# Patient Record
Sex: MALE | Race: WHITE
[De-identification: names, ages, dates, MRNs, and addresses within clinical notes are randomized per-mention and may not be internally consistent; named-entity substitution may affect disease eponyms.]

---

## 2019-02-25 ENCOUNTER — HOSPITAL ENCOUNTER (OUTPATIENT)
Age: 69
End: 2019-02-25
Payer: MEDICARE

## 2019-02-25 DIAGNOSIS — R39.12: Primary | ICD-10-CM

## 2019-02-25 PROCEDURE — 76770 US EXAM ABDO BACK WALL COMP: CPT

## 2019-12-31 ENCOUNTER — HOSPITAL ENCOUNTER (OUTPATIENT)
Dept: HOSPITAL 100 - PT | Age: 69
Discharge: HOME | End: 2019-12-31
Payer: MEDICARE

## 2019-12-31 DIAGNOSIS — M17.12: Primary | ICD-10-CM

## 2019-12-31 PROCEDURE — 97110 THERAPEUTIC EXERCISES: CPT

## 2019-12-31 PROCEDURE — 97161 PT EVAL LOW COMPLEX 20 MIN: CPT

## 2020-04-23 ENCOUNTER — HOSPITAL ENCOUNTER (OUTPATIENT)
Age: 70
End: 2020-04-23
Payer: MEDICARE

## 2020-04-23 ENCOUNTER — HOSPITAL ENCOUNTER (OUTPATIENT)
Dept: HOSPITAL 100 - MTDU | Age: 70
Discharge: HOME | End: 2020-04-23
Payer: MEDICARE

## 2020-04-23 DIAGNOSIS — Z03.818: Primary | ICD-10-CM

## 2020-04-23 DIAGNOSIS — R05: ICD-10-CM

## 2020-04-23 PROCEDURE — G2023 SPECIMEN COLLECT COVID-19: HCPCS

## 2020-04-23 PROCEDURE — 87635 SARS-COV-2 COVID-19 AMP PRB: CPT

## 2020-04-23 PROCEDURE — U0004 COV-19 TEST NON-CDC HGH THRU: HCPCS

## 2020-04-23 PROCEDURE — 71046 X-RAY EXAM CHEST 2 VIEWS: CPT

## 2020-05-02 ENCOUNTER — HOSPITAL ENCOUNTER (EMERGENCY)
Age: 70
Discharge: HOME | End: 2020-05-02
Payer: MEDICARE

## 2020-05-02 VITALS
RESPIRATION RATE: 16 BRPM | OXYGEN SATURATION: 98 % | HEART RATE: 96 BPM | SYSTOLIC BLOOD PRESSURE: 174 MMHG | TEMPERATURE: 97.88 F | DIASTOLIC BLOOD PRESSURE: 69 MMHG

## 2020-05-02 VITALS — BODY MASS INDEX: 29.5 KG/M2

## 2020-05-02 DIAGNOSIS — J45.909: ICD-10-CM

## 2020-05-02 DIAGNOSIS — M79.18: Primary | ICD-10-CM

## 2020-05-02 DIAGNOSIS — G56.03: ICD-10-CM

## 2020-05-02 LAB
ANION GAP: 8 (ref 5–15)
BUN SERPL-MCNC: 20 MG/DL (ref 7–18)
BUN/CREAT RATIO: 19.6 RATIO (ref 10–20)
CALCIUM SERPL-MCNC: 9.1 MG/DL (ref 8.5–10.1)
CARBON DIOXIDE: 23 MMOL/L (ref 21–32)
CHLORIDE: 104 MMOL/L (ref 98–107)
CPK TOTAL, CREATINE KINASE: 48 U/L (ref 39–308)
CRP SERPL-MCNC: 45 MG/L (ref 0–3)
DEPRECATED RDW RBC: 43.5 FL (ref 35.1–43.9)
ERYTHROCYTE [DISTWIDTH] IN BLOOD: 14.5 % (ref 11.6–14.6)
EST GLOM FILT RATE - AFR AMER: 93 ML/MIN (ref 60–?)
ESTIMATED CREATININE CLEARANCE: 70.28 ML/MIN
GLUCOSE: 127 MG/DL (ref 74–106)
HCT VFR BLD AUTO: 41.2 % (ref 40–54)
HEMOGLOBIN: 12.8 G/DL (ref 13–16.5)
HGB BLD-MCNC: 12.8 G/DL (ref 13–16.5)
IMMATURE GRANULOCYTES COUNT: 0.07 X10^3/UL (ref 0–0)
MCV RBC: 82.9 FL (ref 80–94)
MEAN CORP HGB CONC: 31.1 G/DL (ref 32–36)
MEAN PLATELET VOL.: 8.5 FL (ref 6.2–12)
NRBC FLAGGED BY ANALYZER: 0 % (ref 0–5)
PLATELET # BLD: 336 K/MM3 (ref 150–450)
PLATELET COUNT: 336 K/MM3 (ref 150–450)
POTASSIUM: 4.3 MMOL/L (ref 3.5–5.1)
RBC # BLD AUTO: 4.97 M/MM3 (ref 4.6–6.2)
RBC DISTRIBUTION WIDTH CV: 14.5 % (ref 11.6–14.6)
RBC DISTRIBUTION WIDTH SD: 43.5 FL (ref 35.1–43.9)
WBC # BLD AUTO: 13.7 K/MM3 (ref 4.4–11)
WHITE BLOOD COUNT: 13.7 K/MM3 (ref 4.4–11)

## 2020-05-02 PROCEDURE — 86140 C-REACTIVE PROTEIN: CPT

## 2020-05-02 PROCEDURE — A4216 STERILE WATER/SALINE, 10 ML: HCPCS

## 2020-05-02 PROCEDURE — 85652 RBC SED RATE AUTOMATED: CPT

## 2020-05-02 PROCEDURE — 85025 COMPLETE CBC W/AUTO DIFF WBC: CPT

## 2020-05-02 PROCEDURE — 99283 EMERGENCY DEPT VISIT LOW MDM: CPT

## 2020-05-02 PROCEDURE — 82550 ASSAY OF CK (CPK): CPT

## 2020-05-02 PROCEDURE — 80048 BASIC METABOLIC PNL TOTAL CA: CPT

## 2021-08-08 ENCOUNTER — HOSPITAL ENCOUNTER (EMERGENCY)
Age: 71
LOS: 1 days | Discharge: HOME | End: 2021-08-09
Payer: MEDICARE

## 2021-08-08 VITALS
TEMPERATURE: 98.6 F | OXYGEN SATURATION: 95 % | HEART RATE: 101 BPM | SYSTOLIC BLOOD PRESSURE: 149 MMHG | DIASTOLIC BLOOD PRESSURE: 79 MMHG | RESPIRATION RATE: 22 BRPM

## 2021-08-08 VITALS — BODY MASS INDEX: 28.8 KG/M2 | BODY MASS INDEX: 29.5 KG/M2

## 2021-08-08 VITALS — HEART RATE: 112 BPM | RESPIRATION RATE: 16 BRPM

## 2021-08-08 DIAGNOSIS — J98.01: Primary | ICD-10-CM

## 2021-08-08 DIAGNOSIS — Z79.52: ICD-10-CM

## 2021-08-08 DIAGNOSIS — M35.3: ICD-10-CM

## 2021-08-08 PROCEDURE — 71046 X-RAY EXAM CHEST 2 VIEWS: CPT

## 2021-08-08 PROCEDURE — 87426 SARSCOV CORONAVIRUS AG IA: CPT

## 2021-08-08 PROCEDURE — 94640 AIRWAY INHALATION TREATMENT: CPT

## 2021-08-08 PROCEDURE — 99283 EMERGENCY DEPT VISIT LOW MDM: CPT

## 2021-08-09 VITALS
SYSTOLIC BLOOD PRESSURE: 126 MMHG | RESPIRATION RATE: 18 BRPM | DIASTOLIC BLOOD PRESSURE: 52 MMHG | HEART RATE: 67 BPM | OXYGEN SATURATION: 94 %

## 2021-08-09 VITALS — RESPIRATION RATE: 18 BRPM | HEART RATE: 115 BPM | OXYGEN SATURATION: 96 %

## 2021-10-12 ENCOUNTER — HOSPITAL ENCOUNTER (OUTPATIENT)
Age: 71
End: 2021-10-12
Payer: MEDICARE

## 2021-10-12 ENCOUNTER — HOSPITAL ENCOUNTER (EMERGENCY)
Age: 71
LOS: 1 days | Discharge: HOME | End: 2021-10-13
Payer: MEDICARE

## 2021-10-12 VITALS
OXYGEN SATURATION: 96 % | DIASTOLIC BLOOD PRESSURE: 73 MMHG | RESPIRATION RATE: 16 BRPM | HEART RATE: 91 BPM | SYSTOLIC BLOOD PRESSURE: 116 MMHG

## 2021-10-12 VITALS — OXYGEN SATURATION: 94 % | RESPIRATION RATE: 22 BRPM | HEART RATE: 89 BPM

## 2021-10-12 VITALS
SYSTOLIC BLOOD PRESSURE: 119 MMHG | DIASTOLIC BLOOD PRESSURE: 78 MMHG | OXYGEN SATURATION: 97 % | RESPIRATION RATE: 16 BRPM

## 2021-10-12 VITALS
SYSTOLIC BLOOD PRESSURE: 131 MMHG | OXYGEN SATURATION: 96 % | HEART RATE: 89 BPM | DIASTOLIC BLOOD PRESSURE: 80 MMHG | TEMPERATURE: 97.8 F | RESPIRATION RATE: 22 BRPM

## 2021-10-12 VITALS — BODY MASS INDEX: 27.3 KG/M2

## 2021-10-12 DIAGNOSIS — Z79.51: ICD-10-CM

## 2021-10-12 DIAGNOSIS — J45.901: Primary | ICD-10-CM

## 2021-10-12 DIAGNOSIS — J32.0: Primary | ICD-10-CM

## 2021-10-12 DIAGNOSIS — J32.0: ICD-10-CM

## 2021-10-12 PROCEDURE — 70486 CT MAXILLOFACIAL W/O DYE: CPT

## 2021-10-12 PROCEDURE — 99283 EMERGENCY DEPT VISIT LOW MDM: CPT

## 2021-10-12 PROCEDURE — 93005 ELECTROCARDIOGRAM TRACING: CPT

## 2021-10-12 PROCEDURE — 99251: CPT

## 2021-10-12 PROCEDURE — 94640 AIRWAY INHALATION TREATMENT: CPT

## 2021-10-12 PROCEDURE — G0463 HOSPITAL OUTPT CLINIC VISIT: HCPCS

## 2021-10-13 VITALS
SYSTOLIC BLOOD PRESSURE: 115 MMHG | DIASTOLIC BLOOD PRESSURE: 76 MMHG | OXYGEN SATURATION: 95 % | RESPIRATION RATE: 18 BRPM | HEART RATE: 77 BPM

## 2021-10-13 VITALS
RESPIRATION RATE: 10 BRPM | DIASTOLIC BLOOD PRESSURE: 79 MMHG | OXYGEN SATURATION: 97 % | SYSTOLIC BLOOD PRESSURE: 115 MMHG

## 2022-01-03 ENCOUNTER — HOSPITAL ENCOUNTER (OUTPATIENT)
Dept: HOSPITAL 100 - SDC | Age: 72
Discharge: HOME | End: 2022-01-03
Payer: MEDICARE

## 2022-01-03 VITALS
SYSTOLIC BLOOD PRESSURE: 157 MMHG | TEMPERATURE: 97.1 F | DIASTOLIC BLOOD PRESSURE: 76 MMHG | OXYGEN SATURATION: 97 % | RESPIRATION RATE: 16 BRPM | HEART RATE: 56 BPM

## 2022-01-03 VITALS
HEART RATE: 62 BPM | RESPIRATION RATE: 16 BRPM | SYSTOLIC BLOOD PRESSURE: 122 MMHG | OXYGEN SATURATION: 98 % | DIASTOLIC BLOOD PRESSURE: 91 MMHG

## 2022-01-03 VITALS
TEMPERATURE: 97.16 F | DIASTOLIC BLOOD PRESSURE: 76 MMHG | OXYGEN SATURATION: 99 % | HEART RATE: 66 BPM | SYSTOLIC BLOOD PRESSURE: 122 MMHG | RESPIRATION RATE: 16 BRPM

## 2022-01-03 VITALS
OXYGEN SATURATION: 98 % | DIASTOLIC BLOOD PRESSURE: 76 MMHG | HEART RATE: 62 BPM | TEMPERATURE: 96.9 F | SYSTOLIC BLOOD PRESSURE: 141 MMHG | RESPIRATION RATE: 16 BRPM

## 2022-01-03 VITALS
DIASTOLIC BLOOD PRESSURE: 76 MMHG | OXYGEN SATURATION: 99 % | HEART RATE: 65 BPM | SYSTOLIC BLOOD PRESSURE: 150 MMHG | RESPIRATION RATE: 16 BRPM

## 2022-01-03 VITALS
SYSTOLIC BLOOD PRESSURE: 136 MMHG | RESPIRATION RATE: 16 BRPM | HEART RATE: 66 BPM | DIASTOLIC BLOOD PRESSURE: 86 MMHG | OXYGEN SATURATION: 99 %

## 2022-01-03 VITALS
OXYGEN SATURATION: 100 % | DIASTOLIC BLOOD PRESSURE: 76 MMHG | SYSTOLIC BLOOD PRESSURE: 122 MMHG | TEMPERATURE: 97.34 F | HEART RATE: 64 BPM | RESPIRATION RATE: 16 BRPM

## 2022-01-03 VITALS — BODY MASS INDEX: 27.3 KG/M2

## 2022-01-03 DIAGNOSIS — J45.909: ICD-10-CM

## 2022-01-03 DIAGNOSIS — I10: ICD-10-CM

## 2022-01-03 DIAGNOSIS — J33.9: ICD-10-CM

## 2022-01-03 DIAGNOSIS — Z79.899: ICD-10-CM

## 2022-01-03 DIAGNOSIS — J32.8: Primary | ICD-10-CM

## 2022-01-03 DIAGNOSIS — M19.90: ICD-10-CM

## 2022-01-03 PROCEDURE — 88305 TISSUE EXAM BY PATHOLOGIST: CPT

## 2022-01-03 PROCEDURE — 00160 ANES PX NOSE&SINUS NOS: CPT

## 2022-01-03 PROCEDURE — 30110 REMOVAL OF NOSE POLYP(S): CPT

## 2022-01-03 PROCEDURE — 31256 EXPLORATION MAXILLARY SINUS: CPT

## 2022-01-03 PROCEDURE — 31255 NSL/SINS NDSC W/TOT ETHMDCT: CPT

## 2022-01-03 PROCEDURE — 88304 TISSUE EXAM BY PATHOLOGIST: CPT

## 2022-01-03 PROCEDURE — 88311 DECALCIFY TISSUE: CPT

## 2022-01-03 RX ADMIN — LIDOCAINE HYDROCHLORIDE AND EPINEPHRINE 20 ML: 10; 10 INJECTION, SOLUTION INFILTRATION; PERINEURAL at 09:45

## 2022-01-03 RX ADMIN — OXYMETAZOLINE HYDROCHLORIDE 2 SPRAY: 0.05 SPRAY NASAL at 08:02

## 2022-02-18 ENCOUNTER — HOSPITAL ENCOUNTER (EMERGENCY)
Age: 72
Discharge: HOME | End: 2022-02-18
Payer: MEDICARE

## 2022-02-18 VITALS
RESPIRATION RATE: 20 BRPM | HEART RATE: 93 BPM | OXYGEN SATURATION: 99 % | DIASTOLIC BLOOD PRESSURE: 83 MMHG | SYSTOLIC BLOOD PRESSURE: 129 MMHG

## 2022-02-18 VITALS — BODY MASS INDEX: 30.3 KG/M2

## 2022-02-18 VITALS
SYSTOLIC BLOOD PRESSURE: 139 MMHG | RESPIRATION RATE: 16 BRPM | OXYGEN SATURATION: 97 % | DIASTOLIC BLOOD PRESSURE: 81 MMHG | HEART RATE: 85 BPM

## 2022-02-18 VITALS
TEMPERATURE: 98.2 F | SYSTOLIC BLOOD PRESSURE: 119 MMHG | HEART RATE: 71 BPM | RESPIRATION RATE: 18 BRPM | DIASTOLIC BLOOD PRESSURE: 75 MMHG | OXYGEN SATURATION: 100 %

## 2022-02-18 DIAGNOSIS — R55: Primary | ICD-10-CM

## 2022-02-18 DIAGNOSIS — Z96.652: ICD-10-CM

## 2022-02-18 DIAGNOSIS — M35.3: ICD-10-CM

## 2022-02-18 DIAGNOSIS — G56.03: ICD-10-CM

## 2022-02-18 DIAGNOSIS — I10: ICD-10-CM

## 2022-02-18 DIAGNOSIS — M19.90: ICD-10-CM

## 2022-02-18 DIAGNOSIS — J45.909: ICD-10-CM

## 2022-02-18 DIAGNOSIS — Z79.899: ICD-10-CM

## 2022-02-18 LAB
ALANINE AMINOTRANSFER ALT/SGPT: 13 U/L (ref 16–61)
ALBUMIN SERPL-MCNC: 3.1 G/DL (ref 3.2–5)
ALKALINE PHOSPHATASE: 60 U/L (ref 45–117)
ANION GAP: 6 (ref 5–15)
AST(SGOT): 15 U/L (ref 15–37)
BUN SERPL-MCNC: 19 MG/DL (ref 7–18)
BUN/CREAT RATIO: 19.4 RATIO (ref 10–20)
CALCIUM SERPL-MCNC: 8.1 MG/DL (ref 8.5–10.1)
CARBON DIOXIDE: 28 MMOL/L (ref 21–32)
CHLORIDE: 107 MMOL/L (ref 98–107)
DEPRECATED RDW RBC: 45.7 FL (ref 35.1–43.9)
DIFFERENTIAL INDICATED: (no result)
ERYTHROCYTE [DISTWIDTH] IN BLOOD: 13.9 % (ref 11.6–14.6)
EST GLOM FILT RATE - AFR AMER: 97 ML/MIN (ref 60–?)
ESTIMATED CREATININE CLEARANCE: 71.14 ML/MIN
GLOBULIN: 3 G/DL (ref 2.2–4.2)
GLUCOSE: 116 MG/DL (ref 74–106)
HCT VFR BLD AUTO: 34.5 % (ref 40–54)
HEMOGLOBIN: 11.6 G/DL (ref 13–16.5)
HGB BLD-MCNC: 11.6 G/DL (ref 13–16.5)
IMMATURE GRANULOCYTES COUNT: 0.07 X10^3/UL (ref 0–0)
MCV RBC: 90.3 FL (ref 80–94)
MEAN CORP HGB CONC: 33.6 G/DL (ref 32–36)
MEAN PLATELET VOL.: 9.4 FL (ref 6.2–12)
NRBC FLAGGED BY ANALYZER: 0 % (ref 0–5)
PLATELET # BLD: 214 K/MM3 (ref 150–450)
PLATELET COUNT: 214 K/MM3 (ref 150–450)
POSITIVE DIFFERENTIAL: YES
POTASSIUM: 3.6 MMOL/L (ref 3.5–5.1)
RBC # BLD AUTO: 3.82 M/MM3 (ref 4.6–6.2)
RBC DISTRIBUTION WIDTH CV: 13.9 % (ref 11.6–14.6)
RBC DISTRIBUTION WIDTH SD: 45.7 FL (ref 35.1–43.9)
RBC MORPH BLD: (no result) NORMAL
RED CELL MORPHOLOGY: (no result) NORMAL
SCAN SMEAR PER REVIEW CRITERIA: (no result)
TROPONIN-I HS: 6 PG/ML (ref 3–78)
WBC # BLD AUTO: 14.4 K/MM3 (ref 4.4–11)
WHITE BLOOD COUNT: 14.4 K/MM3 (ref 4.4–11)

## 2022-02-18 PROCEDURE — 99285 EMERGENCY DEPT VISIT HI MDM: CPT

## 2022-02-18 PROCEDURE — 71275 CT ANGIOGRAPHY CHEST: CPT

## 2022-02-18 PROCEDURE — 85025 COMPLETE CBC W/AUTO DIFF WBC: CPT

## 2022-02-18 PROCEDURE — 93005 ELECTROCARDIOGRAM TRACING: CPT

## 2022-02-18 PROCEDURE — 96360 HYDRATION IV INFUSION INIT: CPT

## 2022-02-18 PROCEDURE — 84484 ASSAY OF TROPONIN QUANT: CPT

## 2022-02-18 PROCEDURE — 80053 COMPREHEN METABOLIC PANEL: CPT

## 2022-02-18 PROCEDURE — A4216 STERILE WATER/SALINE, 10 ML: HCPCS

## 2022-03-23 ENCOUNTER — HOSPITAL ENCOUNTER (OUTPATIENT)
Dept: HOSPITAL 100 - PT | Age: 72
Discharge: HOME | End: 2022-03-23
Payer: MEDICARE

## 2022-03-23 DIAGNOSIS — M17.12: Primary | ICD-10-CM

## 2022-03-23 PROCEDURE — 97164 PT RE-EVAL EST PLAN CARE: CPT

## 2022-03-23 PROCEDURE — 97110 THERAPEUTIC EXERCISES: CPT

## 2022-03-23 PROCEDURE — 97162 PT EVAL MOD COMPLEX 30 MIN: CPT

## 2022-04-07 ENCOUNTER — HOSPITAL ENCOUNTER (OUTPATIENT)
Dept: HOSPITAL 100 - LABSPEC | Age: 72
Discharge: HOME | End: 2022-04-07
Payer: MEDICARE

## 2022-04-07 DIAGNOSIS — J32.9: Primary | ICD-10-CM

## 2022-04-07 PROCEDURE — 87070 CULTURE OTHR SPECIMN AEROBIC: CPT

## 2022-04-07 PROCEDURE — 87205 SMEAR GRAM STAIN: CPT

## 2022-09-08 ENCOUNTER — HOSPITAL ENCOUNTER (OUTPATIENT)
Dept: HOSPITAL 100 - LABSPEC | Age: 72
Discharge: HOME | End: 2022-09-08
Payer: MEDICARE

## 2022-09-08 DIAGNOSIS — J01.90: Primary | ICD-10-CM

## 2022-09-08 PROCEDURE — 87070 CULTURE OTHR SPECIMN AEROBIC: CPT

## 2022-09-08 PROCEDURE — 87205 SMEAR GRAM STAIN: CPT

## 2022-12-13 ENCOUNTER — HOSPITAL ENCOUNTER (OUTPATIENT)
Dept: HOSPITAL 100 - CVS | Age: 72
Discharge: HOME | End: 2022-12-13
Payer: MEDICARE

## 2022-12-13 DIAGNOSIS — M79.662: Primary | ICD-10-CM

## 2022-12-13 PROCEDURE — 93971 EXTREMITY STUDY: CPT

## 2023-01-05 ENCOUNTER — HOSPITAL ENCOUNTER (OUTPATIENT)
Dept: HOSPITAL 100 - MRI | Age: 73
Discharge: HOME | End: 2023-01-05
Payer: MEDICARE

## 2023-01-05 DIAGNOSIS — M62.10: Primary | ICD-10-CM

## 2023-01-05 PROCEDURE — 73718 MRI LOWER EXTREMITY W/O DYE: CPT

## 2023-03-28 ENCOUNTER — HOSPITAL ENCOUNTER (EMERGENCY)
Age: 73
Discharge: HOME | End: 2023-03-28
Payer: MEDICARE

## 2023-03-28 VITALS — SYSTOLIC BLOOD PRESSURE: 121 MMHG | DIASTOLIC BLOOD PRESSURE: 81 MMHG | HEART RATE: 81 BPM

## 2023-03-28 VITALS
SYSTOLIC BLOOD PRESSURE: 116 MMHG | RESPIRATION RATE: 16 BRPM | OXYGEN SATURATION: 100 % | DIASTOLIC BLOOD PRESSURE: 77 MMHG

## 2023-03-28 VITALS
HEART RATE: 80 BPM | DIASTOLIC BLOOD PRESSURE: 75 MMHG | RESPIRATION RATE: 18 BRPM | SYSTOLIC BLOOD PRESSURE: 112 MMHG | TEMPERATURE: 97.88 F | OXYGEN SATURATION: 99 %

## 2023-03-28 VITALS — BODY MASS INDEX: 30.5 KG/M2

## 2023-03-28 DIAGNOSIS — J45.909: ICD-10-CM

## 2023-03-28 DIAGNOSIS — R55: Primary | ICD-10-CM

## 2023-03-28 DIAGNOSIS — I10: ICD-10-CM

## 2023-03-28 DIAGNOSIS — Z20.822: ICD-10-CM

## 2023-03-28 LAB
ANION GAP: 5 (ref 5–15)
BUN SERPL-MCNC: 23 MG/DL (ref 7–18)
BUN/CREAT RATIO: 18.1 RATIO (ref 10–20)
CALCIUM SERPL-MCNC: 8.5 MG/DL (ref 8.5–10.1)
CARBON DIOXIDE: 27 MMOL/L (ref 21–32)
CHLORIDE: 102 MMOL/L (ref 98–107)
DEPRECATED RDW RBC: 43.5 FL (ref 35.1–43.9)
DIFFERENTIAL INDICATED: (no result)
ERYTHROCYTE [DISTWIDTH] IN BLOOD: 13.5 % (ref 11.6–14.6)
EST GLOM FILT RATE - AFR AMER: 72 ML/MIN (ref 60–?)
ESTIMATED CREATININE CLEARANCE: 50.6 ML/MIN
GLUCOSE: 126 MG/DL (ref 74–106)
HCT VFR BLD AUTO: 45.3 % (ref 40–54)
HEMOGLOBIN: 15.4 G/DL (ref 13–16.5)
HGB BLD-MCNC: 15.4 G/DL (ref 13–16.5)
IMMATURE GRANULOCYTES COUNT: 0.03 X10^3/UL (ref 0–0)
MCV RBC: 87.3 FL (ref 80–94)
MEAN CORP HGB CONC: 34 G/DL (ref 32–36)
MEAN PLATELET VOL.: 9.5 FL (ref 6.2–12)
NRBC FLAGGED BY ANALYZER: 0 % (ref 0–5)
PLATELET # BLD: 186 K/MM3 (ref 150–450)
PLATELET COUNT: 186 K/MM3 (ref 150–450)
POSITIVE DIFFERENTIAL: YES
POTASSIUM: 4.2 MMOL/L (ref 3.5–5.1)
RBC # BLD AUTO: 5.19 M/MM3 (ref 4.6–6.2)
RBC DISTRIBUTION WIDTH CV: 13.5 % (ref 11.6–14.6)
RBC DISTRIBUTION WIDTH SD: 43.5 FL (ref 35.1–43.9)
RBC MORPH BLD: (no result) NORMAL
RED CELL MORPHOLOGY: (no result) NORMAL
SCAN SMEAR PER REVIEW CRITERIA: (no result)
TROPONIN-I HS: 6 PG/ML (ref 3–78)
WBC # BLD AUTO: 9.9 K/MM3 (ref 4.4–11)
WHITE BLOOD COUNT: 9.9 K/MM3 (ref 4.4–11)

## 2023-03-28 PROCEDURE — 85025 COMPLETE CBC W/AUTO DIFF WBC: CPT

## 2023-03-28 PROCEDURE — 80048 BASIC METABOLIC PNL TOTAL CA: CPT

## 2023-03-28 PROCEDURE — 71045 X-RAY EXAM CHEST 1 VIEW: CPT

## 2023-03-28 PROCEDURE — 87811 SARS-COV-2 COVID19 W/OPTIC: CPT

## 2023-03-28 PROCEDURE — 84484 ASSAY OF TROPONIN QUANT: CPT

## 2023-03-28 PROCEDURE — 93005 ELECTROCARDIOGRAM TRACING: CPT

## 2023-03-28 PROCEDURE — 99284 EMERGENCY DEPT VISIT MOD MDM: CPT

## 2023-03-28 PROCEDURE — A4216 STERILE WATER/SALINE, 10 ML: HCPCS

## 2023-05-23 ENCOUNTER — HOSPITAL ENCOUNTER (OUTPATIENT)
Dept: HOSPITAL 100 - LABSPEC | Age: 73
Discharge: HOME | End: 2023-05-23
Payer: MEDICARE

## 2023-05-23 DIAGNOSIS — J32.9: Primary | ICD-10-CM

## 2023-05-23 PROCEDURE — 87070 CULTURE OTHR SPECIMN AEROBIC: CPT

## 2023-05-23 PROCEDURE — 87186 SC STD MICRODIL/AGAR DIL: CPT

## 2023-05-23 PROCEDURE — 87205 SMEAR GRAM STAIN: CPT

## 2023-05-23 PROCEDURE — 87077 CULTURE AEROBIC IDENTIFY: CPT

## 2023-10-23 ENCOUNTER — HOSPITAL ENCOUNTER (OUTPATIENT)
Dept: HOSPITAL 100 - ED | Age: 73
Setting detail: OBSERVATION
LOS: 1 days | Discharge: HOME | End: 2023-10-24
Payer: MEDICARE

## 2023-10-23 VITALS
OXYGEN SATURATION: 94 % | RESPIRATION RATE: 18 BRPM | HEART RATE: 68 BPM | SYSTOLIC BLOOD PRESSURE: 75 MMHG | DIASTOLIC BLOOD PRESSURE: 60 MMHG | TEMPERATURE: 98.2 F

## 2023-10-23 VITALS
RESPIRATION RATE: 18 BRPM | DIASTOLIC BLOOD PRESSURE: 69 MMHG | OXYGEN SATURATION: 97 % | HEART RATE: 66 BPM | SYSTOLIC BLOOD PRESSURE: 107 MMHG

## 2023-10-23 VITALS
RESPIRATION RATE: 18 BRPM | OXYGEN SATURATION: 97 % | DIASTOLIC BLOOD PRESSURE: 65 MMHG | SYSTOLIC BLOOD PRESSURE: 88 MMHG | HEART RATE: 66 BPM

## 2023-10-23 VITALS — BODY MASS INDEX: 30.1 KG/M2 | BODY MASS INDEX: 29.5 KG/M2

## 2023-10-23 VITALS — SYSTOLIC BLOOD PRESSURE: 87 MMHG | HEART RATE: 66 BPM | DIASTOLIC BLOOD PRESSURE: 56 MMHG

## 2023-10-23 VITALS
RESPIRATION RATE: 18 BRPM | HEART RATE: 67 BPM | OXYGEN SATURATION: 97 % | SYSTOLIC BLOOD PRESSURE: 95 MMHG | DIASTOLIC BLOOD PRESSURE: 58 MMHG

## 2023-10-23 DIAGNOSIS — I95.9: ICD-10-CM

## 2023-10-23 DIAGNOSIS — Z79.899: ICD-10-CM

## 2023-10-23 DIAGNOSIS — M35.3: ICD-10-CM

## 2023-10-23 DIAGNOSIS — I10: ICD-10-CM

## 2023-10-23 DIAGNOSIS — J45.909: ICD-10-CM

## 2023-10-23 DIAGNOSIS — M19.90: ICD-10-CM

## 2023-10-23 DIAGNOSIS — K57.32: Primary | ICD-10-CM

## 2023-10-23 LAB
ALANINE AMINOTRANSFER ALT/SGPT: 16 U/L (ref 16–61)
ALBUMIN SERPL-MCNC: 2.9 G/DL (ref 3.2–5)
ALKALINE PHOSPHATASE: 63 U/L (ref 45–117)
ANION GAP: 5 (ref 5–15)
ANISOCYTOSIS BLD QL SMEAR: (no result)
ANISOCYTOSIS: (no result)
APTT PPP: 34.9 SECONDS (ref 24.1–36.2)
AST(SGOT): 24 U/L (ref 15–37)
BILIRUB DIRECT SERPL-MCNC: 0.18 MG/DL (ref 0–0.3)
BUN SERPL-MCNC: 20 MG/DL (ref 7–18)
BUN/CREAT RATIO: 15.5 RATIO (ref 10–20)
CALCIUM SERPL-MCNC: 8.4 MG/DL (ref 8.5–10.1)
CARBON DIOXIDE: 25 MMOL/L (ref 21–32)
CHLORIDE: 107 MMOL/L (ref 98–107)
DEPRECATED RDW RBC: 46.2 FL (ref 35.1–43.9)
DIFFERENTIAL INDICATED: (no result)
ERYTHROCYTE [DISTWIDTH] IN BLOOD: 13.5 % (ref 11.6–14.6)
EST GLOM FILT RATE - AFR AMER: 71 ML/MIN (ref 60–?)
ESTIMATED CREATININE CLEARANCE: 49.11 ML/MIN
GLOBULIN: 3.3 G/DL (ref 2.2–4.2)
GLUCOSE: 145 MG/DL (ref 74–106)
HCT VFR BLD AUTO: 37.1 % (ref 40–54)
HEMOGLOBIN: 11.7 G/DL (ref 13–16.5)
HGB BLD-MCNC: 11.7 G/DL (ref 13–16.5)
IMMATURE GRANULOCYTES COUNT: 0.05 X10^3/UL (ref 0–0)
LIPASE: 52 U/L (ref 13–75)
MACROCYTES BLD QL: (no result)
MACROCYTOSIS: (no result)
MCV RBC: 91.8 FL (ref 80–94)
MEAN CORP HGB CONC: 31.5 G/DL (ref 32–36)
MEAN PLATELET VOL.: 11.7 FL (ref 6.2–12)
NRBC FLAGGED BY ANALYZER: 0 % (ref 0–5)
PLATELET # BLD: (no result) K/MM3 (ref 150–450)
PLATELET COUNT: (no result) K/MM3 (ref 150–450)
POSITIVE COUNT: YES
POTASSIUM: 4 MMOL/L (ref 3.5–5.1)
PROTHROMBIN TIME (PROTIME)PT.: 15.2 SECONDS (ref 11.7–14.9)
RBC # BLD AUTO: 4.04 M/MM3 (ref 4.6–6.2)
RBC DISTRIBUTION WIDTH CV: 13.5 % (ref 11.6–14.6)
RBC DISTRIBUTION WIDTH SD: 46.2 FL (ref 35.1–43.9)
SCAN SMEAR PER REVIEW CRITERIA: (no result)
TROPONIN-I HS: 6 PG/ML (ref 3–78)
WBC # BLD AUTO: 12.9 K/MM3 (ref 4.4–11)
WHITE BLOOD COUNT: 12.9 K/MM3 (ref 4.4–11)

## 2023-10-23 PROCEDURE — 85730 THROMBOPLASTIN TIME PARTIAL: CPT

## 2023-10-23 PROCEDURE — 74177 CT ABD & PELVIS W/CONTRAST: CPT

## 2023-10-23 PROCEDURE — 84484 ASSAY OF TROPONIN QUANT: CPT

## 2023-10-23 PROCEDURE — 96361 HYDRATE IV INFUSION ADD-ON: CPT

## 2023-10-23 PROCEDURE — 96365 THER/PROPH/DIAG IV INF INIT: CPT

## 2023-10-23 PROCEDURE — 80048 BASIC METABOLIC PNL TOTAL CA: CPT

## 2023-10-23 PROCEDURE — 71045 X-RAY EXAM CHEST 1 VIEW: CPT

## 2023-10-23 PROCEDURE — 81001 URINALYSIS AUTO W/SCOPE: CPT

## 2023-10-23 PROCEDURE — 83690 ASSAY OF LIPASE: CPT

## 2023-10-23 PROCEDURE — 85610 PROTHROMBIN TIME: CPT

## 2023-10-23 PROCEDURE — 36415 COLL VENOUS BLD VENIPUNCTURE: CPT

## 2023-10-23 PROCEDURE — 93005 ELECTROCARDIOGRAM TRACING: CPT

## 2023-10-23 PROCEDURE — 87040 BLOOD CULTURE FOR BACTERIA: CPT

## 2023-10-23 PROCEDURE — 85025 COMPLETE CBC W/AUTO DIFF WBC: CPT

## 2023-10-23 PROCEDURE — 96374 THER/PROPH/DIAG INJ IV PUSH: CPT

## 2023-10-23 PROCEDURE — 96372 THER/PROPH/DIAG INJ SC/IM: CPT

## 2023-10-23 PROCEDURE — 83605 ASSAY OF LACTIC ACID: CPT

## 2023-10-23 PROCEDURE — 96366 THER/PROPH/DIAG IV INF ADDON: CPT

## 2023-10-23 PROCEDURE — 99221 1ST HOSP IP/OBS SF/LOW 40: CPT

## 2023-10-23 PROCEDURE — G0378 HOSPITAL OBSERVATION PER HR: HCPCS

## 2023-10-23 PROCEDURE — A4216 STERILE WATER/SALINE, 10 ML: HCPCS

## 2023-10-23 PROCEDURE — 99285 EMERGENCY DEPT VISIT HI MDM: CPT

## 2023-10-23 PROCEDURE — 83735 ASSAY OF MAGNESIUM: CPT

## 2023-10-23 PROCEDURE — 80076 HEPATIC FUNCTION PANEL: CPT

## 2023-10-23 RX ADMIN — SODIUM CHLORIDE 1000 ML: 9 INJECTION, SOLUTION INTRAVENOUS at 22:10

## 2023-10-23 RX ADMIN — SODIUM CHLORIDE 1000 ML: 9 INJECTION, SOLUTION INTRAVENOUS at 22:55

## 2023-10-24 VITALS
DIASTOLIC BLOOD PRESSURE: 68 MMHG | OXYGEN SATURATION: 95 % | RESPIRATION RATE: 18 BRPM | SYSTOLIC BLOOD PRESSURE: 120 MMHG | HEART RATE: 68 BPM

## 2023-10-24 VITALS
DIASTOLIC BLOOD PRESSURE: 84 MMHG | RESPIRATION RATE: 14 BRPM | SYSTOLIC BLOOD PRESSURE: 125 MMHG | OXYGEN SATURATION: 97 % | TEMPERATURE: 97.88 F | HEART RATE: 75 BPM

## 2023-10-24 VITALS
HEART RATE: 64 BPM | TEMPERATURE: 98.06 F | DIASTOLIC BLOOD PRESSURE: 86 MMHG | RESPIRATION RATE: 16 BRPM | SYSTOLIC BLOOD PRESSURE: 130 MMHG | OXYGEN SATURATION: 100 %

## 2023-10-24 VITALS
SYSTOLIC BLOOD PRESSURE: 127 MMHG | DIASTOLIC BLOOD PRESSURE: 76 MMHG | OXYGEN SATURATION: 97 % | HEART RATE: 67 BPM | RESPIRATION RATE: 16 BRPM | TEMPERATURE: 97.52 F

## 2023-10-24 VITALS
OXYGEN SATURATION: 97 % | HEART RATE: 69 BPM | RESPIRATION RATE: 18 BRPM | DIASTOLIC BLOOD PRESSURE: 81 MMHG | SYSTOLIC BLOOD PRESSURE: 116 MMHG

## 2023-10-24 VITALS
RESPIRATION RATE: 18 BRPM | SYSTOLIC BLOOD PRESSURE: 124 MMHG | DIASTOLIC BLOOD PRESSURE: 68 MMHG | HEART RATE: 69 BPM | OXYGEN SATURATION: 97 %

## 2023-10-24 VITALS
HEART RATE: 71 BPM | SYSTOLIC BLOOD PRESSURE: 107 MMHG | DIASTOLIC BLOOD PRESSURE: 72 MMHG | RESPIRATION RATE: 18 BRPM | OXYGEN SATURATION: 97 %

## 2023-10-24 VITALS — OXYGEN SATURATION: 98 %

## 2023-10-24 VITALS
HEART RATE: 67 BPM | OXYGEN SATURATION: 98 % | RESPIRATION RATE: 14 BRPM | TEMPERATURE: 97.88 F | SYSTOLIC BLOOD PRESSURE: 125 MMHG | DIASTOLIC BLOOD PRESSURE: 84 MMHG

## 2023-10-24 LAB
DEPRECATED RDW RBC: 45.5 FL (ref 35.1–43.9)
ERYTHROCYTE [DISTWIDTH] IN BLOOD: 13.4 % (ref 11.6–14.6)
HCT VFR BLD AUTO: 36.5 % (ref 40–54)
HEMOGLOBIN: 11.7 G/DL (ref 13–16.5)
HGB BLD-MCNC: 11.7 G/DL (ref 13–16.5)
IMMATURE GRANULOCYTES COUNT: 0.04 X10^3/UL (ref 0–0)
MAGNESIUM: 2.5 MG/DL (ref 1.6–2.6)
MCV RBC: 91 FL (ref 80–94)
MEAN CORP HGB CONC: 32.1 G/DL (ref 32–36)
MEAN PLATELET VOL.: 10.1 FL (ref 6.2–12)
MUCOUS THREADS URNS QL MICRO: (no result) /HPF
NRBC FLAGGED BY ANALYZER: 0 % (ref 0–5)
PLATELET # BLD: 185 K/MM3 (ref 150–450)
PLATELET COUNT: 185 K/MM3 (ref 150–450)
PROT UR QL STRIP.AUTO: 15 MG/DL
RBC # BLD AUTO: 4.01 M/MM3 (ref 4.6–6.2)
RBC DISTRIBUTION WIDTH CV: 13.4 % (ref 11.6–14.6)
RBC DISTRIBUTION WIDTH SD: 45.5 FL (ref 35.1–43.9)
RBC UR QL: (no result) /HPF (ref 0–5)
RBC UR QL: 25 /UL
SP GR UR: 1 (ref 1–1.03)
SQUAMOUS URNS QL MICRO: (no result) /HPF (ref 0–5)
URINE PRESERVATIVE: (no result)
WBC # BLD AUTO: 10.9 K/MM3 (ref 4.4–11)
WHITE BLOOD COUNT: 10.9 K/MM3 (ref 4.4–11)

## 2023-10-24 RX ADMIN — PIPERACILLIN SODIUM AND TAZOBACTAM SODIUM 12.5 GM: 3; .375 INJECTION, POWDER, LYOPHILIZED, FOR SOLUTION INTRAVENOUS at 05:20

## 2023-10-24 RX ADMIN — PIPERACILLIN AND TAZOBACTAM 200 GM: 4; .5 INJECTION, POWDER, FOR SOLUTION INTRAVENOUS at 00:16

## 2023-10-24 RX ADMIN — SODIUM CHLORIDE 250 ML: 9 INJECTION, SOLUTION INTRAVENOUS at 00:16

## 2023-10-24 RX ADMIN — SODIUM CHLORIDE, PRESERVATIVE FREE 0 ML: 5 INJECTION INTRAVENOUS at 05:20

## 2023-10-31 ENCOUNTER — HOSPITAL ENCOUNTER (OUTPATIENT)
Dept: HOSPITAL 100 - LABSPEC | Age: 73
Discharge: HOME | End: 2023-10-31
Payer: MEDICARE

## 2023-10-31 DIAGNOSIS — L27.0: Primary | ICD-10-CM

## 2023-10-31 LAB
ALANINE AMINOTRANSFER ALT/SGPT: 89 U/L (ref 16–61)
ALBUMIN SERPL-MCNC: 3.7 G/DL (ref 3.2–5)
ALKALINE PHOSPHATASE: 68 U/L (ref 45–117)
ANION GAP: 4 (ref 5–15)
AST(SGOT): 47 U/L (ref 15–37)
BUN SERPL-MCNC: 17 MG/DL (ref 7–18)
BUN/CREAT RATIO: 14.7 RATIO (ref 10–20)
CALCIUM SERPL-MCNC: 9 MG/DL (ref 8.5–10.1)
CARBON DIOXIDE: 30 MMOL/L (ref 21–32)
CHLORIDE: 107 MMOL/L (ref 98–107)
DEPRECATED RDW RBC: 43.9 FL (ref 35.1–43.9)
ERYTHROCYTE [DISTWIDTH] IN BLOOD: 13.5 % (ref 11.6–14.6)
EST GLOM FILT RATE - AFR AMER: 80 ML/MIN (ref 60–?)
GLOBULIN: 3 G/DL (ref 2.2–4.2)
GLUCOSE: 117 MG/DL (ref 74–106)
HCT VFR BLD AUTO: 46.5 % (ref 40–54)
HEMOGLOBIN: 15 G/DL (ref 13–16.5)
HGB BLD-MCNC: 15 G/DL (ref 13–16.5)
IMMATURE GRANULOCYTES COUNT: 0.03 X10^3/UL (ref 0–0)
MCV RBC: 88.4 FL (ref 80–94)
MEAN CORP HGB CONC: 32.3 G/DL (ref 32–36)
MEAN PLATELET VOL.: 9.2 FL (ref 6.2–12)
NRBC FLAGGED BY ANALYZER: 0 % (ref 0–5)
PLATELET # BLD: 360 K/MM3 (ref 150–450)
PLATELET COUNT: 360 K/MM3 (ref 150–450)
POTASSIUM: 3.8 MMOL/L (ref 3.5–5.1)
RBC # BLD AUTO: 5.26 M/MM3 (ref 4.6–6.2)
RBC DISTRIBUTION WIDTH CV: 13.5 % (ref 11.6–14.6)
RBC DISTRIBUTION WIDTH SD: 43.9 FL (ref 35.1–43.9)
WBC # BLD AUTO: 9.8 K/MM3 (ref 4.4–11)
WHITE BLOOD COUNT: 9.8 K/MM3 (ref 4.4–11)

## 2023-10-31 PROCEDURE — 85025 COMPLETE CBC W/AUTO DIFF WBC: CPT

## 2023-10-31 PROCEDURE — 80053 COMPREHEN METABOLIC PANEL: CPT

## 2023-10-31 PROCEDURE — 82248 BILIRUBIN DIRECT: CPT

## 2023-11-01 LAB — BILIRUB DIRECT SERPL-MCNC: 0.17 MG/DL (ref 0–0.3)

## 2023-11-02 ENCOUNTER — HOSPITAL ENCOUNTER (INPATIENT)
Dept: HOSPITAL 100 - ED | Age: 73
LOS: 4 days | Discharge: HOME | DRG: 329 | End: 2023-11-06
Payer: MEDICARE

## 2023-11-02 VITALS
RESPIRATION RATE: 18 BRPM | OXYGEN SATURATION: 100 % | DIASTOLIC BLOOD PRESSURE: 87 MMHG | SYSTOLIC BLOOD PRESSURE: 131 MMHG | HEART RATE: 99 BPM

## 2023-11-02 VITALS
TEMPERATURE: 97.7 F | OXYGEN SATURATION: 98 % | DIASTOLIC BLOOD PRESSURE: 81 MMHG | HEART RATE: 94 BPM | SYSTOLIC BLOOD PRESSURE: 126 MMHG | RESPIRATION RATE: 18 BRPM

## 2023-11-02 VITALS
HEART RATE: 99 BPM | TEMPERATURE: 101.12 F | RESPIRATION RATE: 20 BRPM | DIASTOLIC BLOOD PRESSURE: 81 MMHG | SYSTOLIC BLOOD PRESSURE: 126 MMHG | OXYGEN SATURATION: 92 %

## 2023-11-02 VITALS
DIASTOLIC BLOOD PRESSURE: 76 MMHG | HEART RATE: 96 BPM | OXYGEN SATURATION: 93 % | SYSTOLIC BLOOD PRESSURE: 126 MMHG | RESPIRATION RATE: 18 BRPM

## 2023-11-02 VITALS
HEART RATE: 94 BPM | DIASTOLIC BLOOD PRESSURE: 88 MMHG | RESPIRATION RATE: 16 BRPM | OXYGEN SATURATION: 99 % | TEMPERATURE: 97.6 F | SYSTOLIC BLOOD PRESSURE: 150 MMHG

## 2023-11-02 VITALS
HEART RATE: 95 BPM | TEMPERATURE: 98.06 F | DIASTOLIC BLOOD PRESSURE: 81 MMHG | RESPIRATION RATE: 18 BRPM | SYSTOLIC BLOOD PRESSURE: 105 MMHG | OXYGEN SATURATION: 94 %

## 2023-11-02 VITALS
SYSTOLIC BLOOD PRESSURE: 126 MMHG | HEART RATE: 96 BPM | RESPIRATION RATE: 18 BRPM | DIASTOLIC BLOOD PRESSURE: 76 MMHG | OXYGEN SATURATION: 95 %

## 2023-11-02 VITALS — BODY MASS INDEX: 28.9 KG/M2 | BODY MASS INDEX: 29.2 KG/M2

## 2023-11-02 DIAGNOSIS — K57.20: Primary | ICD-10-CM

## 2023-11-02 DIAGNOSIS — I10: ICD-10-CM

## 2023-11-02 DIAGNOSIS — K65.9: ICD-10-CM

## 2023-11-02 DIAGNOSIS — M35.3: ICD-10-CM

## 2023-11-02 LAB
ANION GAP: 5 (ref 5–15)
BUN SERPL-MCNC: 25 MG/DL (ref 7–18)
BUN/CREAT RATIO: 21.6 RATIO (ref 10–20)
CALCIUM SERPL-MCNC: 8.7 MG/DL (ref 8.5–10.1)
CARBON DIOXIDE: 27 MMOL/L (ref 21–32)
CHLORIDE: 107 MMOL/L (ref 98–107)
DEPRECATED RDW RBC: 46.5 FL (ref 35.1–43.9)
DIFFERENTIAL COMMENT: (no result)
DIFFERENTIAL INDICATED: (no result)
ERYTHROCYTE [DISTWIDTH] IN BLOOD: 14.1 % (ref 11.6–14.6)
EST GLOM FILT RATE - AFR AMER: 80 ML/MIN (ref 60–?)
ESTIMATED CREATININE CLEARANCE: 54.61 ML/MIN
GLUCOSE: 195 MG/DL (ref 74–106)
HCT VFR BLD AUTO: 43.6 % (ref 40–54)
HEMOGLOBIN: 13.8 G/DL (ref 13–16.5)
HGB BLD-MCNC: 13.8 G/DL (ref 13–16.5)
IMMATURE GRANULOCYTES COUNT: 0.2 X10^3/UL (ref 0–0)
MANUAL DIF COMMENT BLD-IMP: (no result)
MCV RBC: 90.3 FL (ref 80–94)
MEAN CORP HGB CONC: 31.7 G/DL (ref 32–36)
MEAN PLATELET VOL.: 9.1 FL (ref 6.2–12)
MUCOUS THREADS URNS QL MICRO: (no result) /HPF
NRBC FLAGGED BY ANALYZER: 0 % (ref 0–5)
PLATELET # BLD: 320 K/MM3 (ref 150–450)
PLATELET COUNT: 320 K/MM3 (ref 150–450)
POSITIVE DIFFERENTIAL: YES
POTASSIUM: 3.8 MMOL/L (ref 3.5–5.1)
PROT UR QL STRIP.AUTO: 15 MG/DL
RBC # BLD AUTO: 4.83 M/MM3 (ref 4.6–6.2)
RBC DISTRIBUTION WIDTH CV: 14.1 % (ref 11.6–14.6)
RBC DISTRIBUTION WIDTH SD: 46.5 FL (ref 35.1–43.9)
RBC UR QL: (no result) /HPF (ref 0–5)
RBC UR QL: 25 /UL
SCAN SMEAR PER REVIEW CRITERIA: (no result)
SP GR UR: 1.02 (ref 1–1.03)
SQUAMOUS URNS QL MICRO: (no result) /HPF (ref 0–5)
URINE PRESERVATIVE: (no result)
WBC # BLD AUTO: 23.4 K/MM3 (ref 4.4–11)
WHITE BLOOD COUNT: 23.4 K/MM3 (ref 4.4–11)

## 2023-11-02 PROCEDURE — 87077 CULTURE AEROBIC IDENTIFY: CPT

## 2023-11-02 PROCEDURE — 80053 COMPREHEN METABOLIC PANEL: CPT

## 2023-11-02 PROCEDURE — G0463 HOSPITAL OUTPT CLINIC VISIT: HCPCS

## 2023-11-02 PROCEDURE — 97802 MEDICAL NUTRITION INDIV IN: CPT

## 2023-11-02 PROCEDURE — 87070 CULTURE OTHR SPECIMN AEROBIC: CPT

## 2023-11-02 PROCEDURE — 94668 MNPJ CHEST WALL SBSQ: CPT

## 2023-11-02 PROCEDURE — 36415 COLL VENOUS BLD VENIPUNCTURE: CPT

## 2023-11-02 PROCEDURE — 87205 SMEAR GRAM STAIN: CPT

## 2023-11-02 PROCEDURE — 81001 URINALYSIS AUTO W/SCOPE: CPT

## 2023-11-02 PROCEDURE — 83735 ASSAY OF MAGNESIUM: CPT

## 2023-11-02 PROCEDURE — 87186 SC STD MICRODIL/AGAR DIL: CPT

## 2023-11-02 PROCEDURE — 94640 AIRWAY INHALATION TREATMENT: CPT

## 2023-11-02 PROCEDURE — 85025 COMPLETE CBC W/AUTO DIFF WBC: CPT

## 2023-11-02 PROCEDURE — 99252 IP/OBS CONSLTJ NEW/EST SF 35: CPT

## 2023-11-02 PROCEDURE — 80048 BASIC METABOLIC PNL TOTAL CA: CPT

## 2023-11-02 PROCEDURE — 99284 EMERGENCY DEPT VISIT MOD MDM: CPT

## 2023-11-02 PROCEDURE — 88305 TISSUE EXAM BY PATHOLOGIST: CPT

## 2023-11-02 PROCEDURE — 84100 ASSAY OF PHOSPHORUS: CPT

## 2023-11-02 PROCEDURE — 74177 CT ABD & PELVIS W/CONTRAST: CPT

## 2023-11-02 PROCEDURE — 82248 BILIRUBIN DIRECT: CPT

## 2023-11-02 PROCEDURE — A4216 STERILE WATER/SALINE, 10 ML: HCPCS

## 2023-11-02 PROCEDURE — 87075 CULTR BACTERIA EXCEPT BLOOD: CPT

## 2023-11-02 PROCEDURE — 0DBN4ZZ EXCISION OF SIGMOID COLON, PERCUTANEOUS ENDOSCOPIC APPROACH: ICD-10-PCS | Performed by: STUDENT IN AN ORGANIZED HEALTH CARE EDUCATION/TRAINING PROGRAM

## 2023-11-02 RX ADMIN — PIPERACILLIN AND TAZOBACTAM 200 GM: 4; .5 INJECTION, POWDER, FOR SOLUTION INTRAVENOUS at 12:18

## 2023-11-02 RX ADMIN — SODIUM CHLORIDE 10 ML: 9 INJECTION, SOLUTION INTRAMUSCULAR; INTRAVENOUS; SUBCUTANEOUS at 20:00

## 2023-11-02 RX ADMIN — SODIUM CHLORIDE 100 ML: 9 INJECTION, SOLUTION INTRAVENOUS at 15:52

## 2023-11-02 RX ADMIN — BUPIVACAINE 20 ML: 13.3 INJECTION, SUSPENSION, LIPOSOMAL INFILTRATION at 20:00

## 2023-11-02 RX ADMIN — SODIUM CHLORIDE 1000 ML: 9 INJECTION, SOLUTION INTRAVENOUS at 11:40

## 2023-11-02 RX ADMIN — PIPERACILLIN SODIUM AND TAZOBACTAM SODIUM 12.5 GM: 3; .375 INJECTION, POWDER, LYOPHILIZED, FOR SOLUTION INTRAVENOUS at 19:03

## 2023-11-03 VITALS
RESPIRATION RATE: 18 BRPM | TEMPERATURE: 99.3 F | SYSTOLIC BLOOD PRESSURE: 114 MMHG | OXYGEN SATURATION: 95 % | DIASTOLIC BLOOD PRESSURE: 75 MMHG | HEART RATE: 90 BPM

## 2023-11-03 VITALS
HEART RATE: 85 BPM | RESPIRATION RATE: 18 BRPM | SYSTOLIC BLOOD PRESSURE: 107 MMHG | DIASTOLIC BLOOD PRESSURE: 71 MMHG | TEMPERATURE: 98.9 F | OXYGEN SATURATION: 95 %

## 2023-11-03 VITALS
DIASTOLIC BLOOD PRESSURE: 60 MMHG | SYSTOLIC BLOOD PRESSURE: 90 MMHG | OXYGEN SATURATION: 95 % | HEART RATE: 68 BPM | TEMPERATURE: 97.52 F | RESPIRATION RATE: 18 BRPM

## 2023-11-03 VITALS
HEART RATE: 95 BPM | DIASTOLIC BLOOD PRESSURE: 66 MMHG | SYSTOLIC BLOOD PRESSURE: 108 MMHG | TEMPERATURE: 99.14 F | RESPIRATION RATE: 18 BRPM | OXYGEN SATURATION: 97 %

## 2023-11-03 VITALS
OXYGEN SATURATION: 95 % | SYSTOLIC BLOOD PRESSURE: 130 MMHG | DIASTOLIC BLOOD PRESSURE: 79 MMHG | RESPIRATION RATE: 18 BRPM | TEMPERATURE: 99.7 F | HEART RATE: 89 BPM

## 2023-11-03 VITALS
RESPIRATION RATE: 18 BRPM | OXYGEN SATURATION: 92 % | DIASTOLIC BLOOD PRESSURE: 81 MMHG | HEART RATE: 98 BPM | TEMPERATURE: 98.9 F | SYSTOLIC BLOOD PRESSURE: 135 MMHG

## 2023-11-03 VITALS
HEART RATE: 91 BPM | TEMPERATURE: 98.42 F | SYSTOLIC BLOOD PRESSURE: 134 MMHG | RESPIRATION RATE: 18 BRPM | OXYGEN SATURATION: 96 % | DIASTOLIC BLOOD PRESSURE: 84 MMHG

## 2023-11-03 VITALS
RESPIRATION RATE: 18 BRPM | HEART RATE: 93 BPM | DIASTOLIC BLOOD PRESSURE: 71 MMHG | SYSTOLIC BLOOD PRESSURE: 125 MMHG | TEMPERATURE: 100.1 F | OXYGEN SATURATION: 96 %

## 2023-11-03 VITALS — TEMPERATURE: 98.3 F

## 2023-11-03 VITALS
DIASTOLIC BLOOD PRESSURE: 69 MMHG | OXYGEN SATURATION: 95 % | RESPIRATION RATE: 18 BRPM | SYSTOLIC BLOOD PRESSURE: 105 MMHG | HEART RATE: 96 BPM | TEMPERATURE: 99.68 F

## 2023-11-03 VITALS
DIASTOLIC BLOOD PRESSURE: 76 MMHG | SYSTOLIC BLOOD PRESSURE: 116 MMHG | RESPIRATION RATE: 16 BRPM | OXYGEN SATURATION: 96 % | HEART RATE: 89 BPM | TEMPERATURE: 98.96 F

## 2023-11-03 VITALS — OXYGEN SATURATION: 95 %

## 2023-11-03 LAB
ANION GAP: 7 (ref 5–15)
BUN SERPL-MCNC: 18 MG/DL (ref 7–18)
BUN/CREAT RATIO: 17.8 RATIO (ref 10–20)
CALCIUM SERPL-MCNC: 7.6 MG/DL (ref 8.5–10.1)
CARBON DIOXIDE: 25 MMOL/L (ref 21–32)
CHLORIDE: 107 MMOL/L (ref 98–107)
DEPRECATED RDW RBC: 47.3 FL (ref 35.1–43.9)
DIFFERENTIAL COMMENT: (no result)
DIFFERENTIAL INDICATED: (no result)
ERYTHROCYTE [DISTWIDTH] IN BLOOD: 14.4 % (ref 11.6–14.6)
EST GLOM FILT RATE - AFR AMER: 94 ML/MIN (ref 60–?)
ESTIMATED CREATININE CLEARANCE: 62.72 ML/MIN
GLUCOSE: 118 MG/DL (ref 74–106)
HCT VFR BLD AUTO: 41.1 % (ref 40–54)
HEMOGLOBIN: 13 G/DL (ref 13–16.5)
HGB BLD-MCNC: 13 G/DL (ref 13–16.5)
IMMATURE GRANULOCYTES COUNT: 0.11 X10^3/UL (ref 0–0)
MANUAL DIF COMMENT BLD-IMP: (no result)
MCV RBC: 90.3 FL (ref 80–94)
MEAN CORP HGB CONC: 31.6 G/DL (ref 32–36)
MEAN PLATELET VOL.: 9.7 FL (ref 6.2–12)
NRBC FLAGGED BY ANALYZER: 0 % (ref 0–5)
PLATELET # BLD: 287 K/MM3 (ref 150–450)
PLATELET COUNT: 287 K/MM3 (ref 150–450)
POSITIVE DIFFERENTIAL: YES
POTASSIUM: 3.8 MMOL/L (ref 3.5–5.1)
RBC # BLD AUTO: 4.55 M/MM3 (ref 4.6–6.2)
RBC DISTRIBUTION WIDTH CV: 14.4 % (ref 11.6–14.6)
RBC DISTRIBUTION WIDTH SD: 47.3 FL (ref 35.1–43.9)
SCAN SMEAR PER REVIEW CRITERIA: (no result)
WBC # BLD AUTO: 22.2 K/MM3 (ref 4.4–11)
WHITE BLOOD COUNT: 22.2 K/MM3 (ref 4.4–11)

## 2023-11-03 RX ADMIN — PIPERACILLIN SODIUM AND TAZOBACTAM SODIUM 12.5 GM: 3; .375 INJECTION, POWDER, LYOPHILIZED, FOR SOLUTION INTRAVENOUS at 14:15

## 2023-11-03 RX ADMIN — PIPERACILLIN SODIUM AND TAZOBACTAM SODIUM 12.5 GM: 3; .375 INJECTION, POWDER, LYOPHILIZED, FOR SOLUTION INTRAVENOUS at 06:11

## 2023-11-03 RX ADMIN — SODIUM CHLORIDE 999 ML: 9 INJECTION, SOLUTION INTRAVENOUS at 11:04

## 2023-11-03 RX ADMIN — SODIUM CHLORIDE, PRESERVATIVE FREE 0 ML: 5 INJECTION INTRAVENOUS at 17:25

## 2023-11-03 RX ADMIN — SODIUM CHLORIDE 100 ML: 9 INJECTION, SOLUTION INTRAVENOUS at 04:04

## 2023-11-03 RX ADMIN — SODIUM CHLORIDE, PRESERVATIVE FREE 0 ML: 5 INJECTION INTRAVENOUS at 12:15

## 2023-11-03 RX ADMIN — PIPERACILLIN SODIUM AND TAZOBACTAM SODIUM 12.5 GM: 3; .375 INJECTION, POWDER, LYOPHILIZED, FOR SOLUTION INTRAVENOUS at 21:18

## 2023-11-03 RX ADMIN — SODIUM CHLORIDE 100 ML: 9 INJECTION, SOLUTION INTRAVENOUS at 14:15

## 2023-11-04 VITALS
TEMPERATURE: 98.96 F | RESPIRATION RATE: 18 BRPM | HEART RATE: 78 BPM | DIASTOLIC BLOOD PRESSURE: 87 MMHG | SYSTOLIC BLOOD PRESSURE: 128 MMHG | OXYGEN SATURATION: 97 %

## 2023-11-04 VITALS
HEART RATE: 73 BPM | DIASTOLIC BLOOD PRESSURE: 76 MMHG | OXYGEN SATURATION: 95 % | SYSTOLIC BLOOD PRESSURE: 117 MMHG | RESPIRATION RATE: 16 BRPM | TEMPERATURE: 98.06 F

## 2023-11-04 VITALS
DIASTOLIC BLOOD PRESSURE: 77 MMHG | TEMPERATURE: 97.9 F | SYSTOLIC BLOOD PRESSURE: 127 MMHG | OXYGEN SATURATION: 94 % | RESPIRATION RATE: 16 BRPM | HEART RATE: 94 BPM

## 2023-11-04 VITALS — RESPIRATION RATE: 18 BRPM | HEART RATE: 93 BPM

## 2023-11-04 VITALS
HEART RATE: 76 BPM | DIASTOLIC BLOOD PRESSURE: 79 MMHG | OXYGEN SATURATION: 96 % | SYSTOLIC BLOOD PRESSURE: 118 MMHG | TEMPERATURE: 98.7 F | RESPIRATION RATE: 16 BRPM

## 2023-11-04 VITALS — HEART RATE: 78 BPM | DIASTOLIC BLOOD PRESSURE: 81 MMHG | OXYGEN SATURATION: 96 % | SYSTOLIC BLOOD PRESSURE: 122 MMHG

## 2023-11-04 LAB
ANION GAP: 4 (ref 5–15)
BUN SERPL-MCNC: 17 MG/DL (ref 7–18)
BUN/CREAT RATIO: 16.2 RATIO (ref 10–20)
CALCIUM SERPL-MCNC: 7.5 MG/DL (ref 8.5–10.1)
CARBON DIOXIDE: 27 MMOL/L (ref 21–32)
CHLORIDE: 110 MMOL/L (ref 98–107)
DEPRECATED RDW RBC: 48.6 FL (ref 35.1–43.9)
ERYTHROCYTE [DISTWIDTH] IN BLOOD: 14.5 % (ref 11.6–14.6)
EST GLOM FILT RATE - AFR AMER: 89 ML/MIN (ref 60–?)
ESTIMATED CREATININE CLEARANCE: 60.33 ML/MIN
GLUCOSE: 107 MG/DL (ref 74–106)
HCT VFR BLD AUTO: 39.5 % (ref 40–54)
HEMOGLOBIN: 12.6 G/DL (ref 13–16.5)
HGB BLD-MCNC: 12.6 G/DL (ref 13–16.5)
IMMATURE GRANULOCYTES COUNT: 0.11 X10^3/UL (ref 0–0)
MAGNESIUM: 2.2 MG/DL (ref 1.6–2.6)
MCV RBC: 91.2 FL (ref 80–94)
MEAN CORP HGB CONC: 31.9 G/DL (ref 32–36)
MEAN PLATELET VOL.: 9.5 FL (ref 6.2–12)
NRBC FLAGGED BY ANALYZER: 0 % (ref 0–5)
PLATELET # BLD: 233 K/MM3 (ref 150–450)
PLATELET COUNT: 233 K/MM3 (ref 150–450)
POTASSIUM: 3.7 MMOL/L (ref 3.5–5.1)
RBC # BLD AUTO: 4.33 M/MM3 (ref 4.6–6.2)
RBC DISTRIBUTION WIDTH CV: 14.5 % (ref 11.6–14.6)
RBC DISTRIBUTION WIDTH SD: 48.6 FL (ref 35.1–43.9)
WBC # BLD AUTO: 17.9 K/MM3 (ref 4.4–11)
WHITE BLOOD COUNT: 17.9 K/MM3 (ref 4.4–11)

## 2023-11-04 RX ADMIN — SODIUM CHLORIDE, PRESERVATIVE FREE 0 ML: 5 INJECTION INTRAVENOUS at 23:09

## 2023-11-04 RX ADMIN — PIPERACILLIN SODIUM AND TAZOBACTAM SODIUM 12.5 GM: 3; .375 INJECTION, POWDER, LYOPHILIZED, FOR SOLUTION INTRAVENOUS at 06:50

## 2023-11-04 RX ADMIN — SODIUM CHLORIDE, PRESERVATIVE FREE 0 ML: 5 INJECTION INTRAVENOUS at 12:52

## 2023-11-04 RX ADMIN — PIPERACILLIN SODIUM AND TAZOBACTAM SODIUM 12.5 GM: 3; .375 INJECTION, POWDER, LYOPHILIZED, FOR SOLUTION INTRAVENOUS at 14:10

## 2023-11-04 RX ADMIN — POTASSIUM PHOSPHATE, MONOBASIC AND POTASSIUM PHOSPHATE, DIBASIC 42 MM: 224; 236 INJECTION, SOLUTION, CONCENTRATE INTRAVENOUS at 09:34

## 2023-11-04 RX ADMIN — SODIUM CHLORIDE 100 ML: 9 INJECTION, SOLUTION INTRAVENOUS at 00:23

## 2023-11-04 RX ADMIN — SODIUM CHLORIDE, PRESERVATIVE FREE 0 ML: 5 INJECTION INTRAVENOUS at 18:12

## 2023-11-04 RX ADMIN — ALBUTEROL SULFATE 2.5 MG: 2.5 SOLUTION RESPIRATORY (INHALATION) at 20:46

## 2023-11-04 RX ADMIN — SODIUM CHLORIDE 15 ML: 9 INJECTION, SOLUTION INTRAVENOUS at 23:08

## 2023-11-04 RX ADMIN — SODIUM CHLORIDE 100 ML: 9 INJECTION, SOLUTION INTRAVENOUS at 10:56

## 2023-11-04 RX ADMIN — PIPERACILLIN SODIUM AND TAZOBACTAM SODIUM 12.5 GM: 3; .375 INJECTION, POWDER, LYOPHILIZED, FOR SOLUTION INTRAVENOUS at 23:08

## 2023-11-04 RX ADMIN — SODIUM CHLORIDE 100 ML: 9 INJECTION, SOLUTION INTRAVENOUS at 20:26

## 2023-11-05 VITALS
OXYGEN SATURATION: 98 % | SYSTOLIC BLOOD PRESSURE: 116 MMHG | DIASTOLIC BLOOD PRESSURE: 78 MMHG | RESPIRATION RATE: 18 BRPM | HEART RATE: 66 BPM | TEMPERATURE: 97.5 F

## 2023-11-05 VITALS — DIASTOLIC BLOOD PRESSURE: 81 MMHG | HEART RATE: 74 BPM | SYSTOLIC BLOOD PRESSURE: 111 MMHG

## 2023-11-05 VITALS
HEART RATE: 70 BPM | RESPIRATION RATE: 16 BRPM | DIASTOLIC BLOOD PRESSURE: 84 MMHG | OXYGEN SATURATION: 100 % | SYSTOLIC BLOOD PRESSURE: 125 MMHG | TEMPERATURE: 97.9 F

## 2023-11-05 VITALS
RESPIRATION RATE: 16 BRPM | DIASTOLIC BLOOD PRESSURE: 79 MMHG | TEMPERATURE: 98.1 F | SYSTOLIC BLOOD PRESSURE: 121 MMHG | OXYGEN SATURATION: 94 % | HEART RATE: 75 BPM

## 2023-11-05 VITALS
OXYGEN SATURATION: 100 % | TEMPERATURE: 99.1 F | SYSTOLIC BLOOD PRESSURE: 139 MMHG | HEART RATE: 72 BPM | DIASTOLIC BLOOD PRESSURE: 89 MMHG | RESPIRATION RATE: 18 BRPM

## 2023-11-05 VITALS
OXYGEN SATURATION: 97 % | HEART RATE: 84 BPM | TEMPERATURE: 98.2 F | DIASTOLIC BLOOD PRESSURE: 76 MMHG | RESPIRATION RATE: 16 BRPM | SYSTOLIC BLOOD PRESSURE: 118 MMHG

## 2023-11-05 VITALS — OXYGEN SATURATION: 95 %

## 2023-11-05 LAB
ANION GAP: 7 (ref 5–15)
BUN SERPL-MCNC: 21 MG/DL (ref 7–18)
BUN/CREAT RATIO: 20.4 RATIO (ref 10–20)
CALCIUM SERPL-MCNC: 8.4 MG/DL (ref 8.5–10.1)
CARBON DIOXIDE: 24 MMOL/L (ref 21–32)
CHLORIDE: 109 MMOL/L (ref 98–107)
DEPRECATED RDW RBC: 47.9 FL (ref 35.1–43.9)
ERYTHROCYTE [DISTWIDTH] IN BLOOD: 14.4 % (ref 11.6–14.6)
EST GLOM FILT RATE - AFR AMER: 91 ML/MIN (ref 60–?)
ESTIMATED CREATININE CLEARANCE: 61.5 ML/MIN
GLUCOSE: 115 MG/DL (ref 74–106)
HCT VFR BLD AUTO: 40.7 % (ref 40–54)
HEMOGLOBIN: 13 G/DL (ref 13–16.5)
HGB BLD-MCNC: 13 G/DL (ref 13–16.5)
IMMATURE GRANULOCYTES COUNT: 0.12 X10^3/UL (ref 0–0)
MAGNESIUM: 2.3 MG/DL (ref 1.6–2.6)
MCV RBC: 91.1 FL (ref 80–94)
MEAN CORP HGB CONC: 31.9 G/DL (ref 32–36)
MEAN PLATELET VOL.: 9.8 FL (ref 6.2–12)
NRBC FLAGGED BY ANALYZER: 0 % (ref 0–5)
PLATELET # BLD: 337 K/MM3 (ref 150–450)
PLATELET COUNT: 337 K/MM3 (ref 150–450)
POTASSIUM: 3.6 MMOL/L (ref 3.5–5.1)
RBC # BLD AUTO: 4.47 M/MM3 (ref 4.6–6.2)
RBC DISTRIBUTION WIDTH CV: 14.4 % (ref 11.6–14.6)
RBC DISTRIBUTION WIDTH SD: 47.9 FL (ref 35.1–43.9)
WBC # BLD AUTO: 15.9 K/MM3 (ref 4.4–11)
WHITE BLOOD COUNT: 15.9 K/MM3 (ref 4.4–11)

## 2023-11-05 RX ADMIN — PIPERACILLIN SODIUM AND TAZOBACTAM SODIUM 12.5 GM: 3; .375 INJECTION, POWDER, LYOPHILIZED, FOR SOLUTION INTRAVENOUS at 20:37

## 2023-11-05 RX ADMIN — PIPERACILLIN SODIUM AND TAZOBACTAM SODIUM 12.5 GM: 3; .375 INJECTION, POWDER, LYOPHILIZED, FOR SOLUTION INTRAVENOUS at 05:37

## 2023-11-05 RX ADMIN — SODIUM CHLORIDE 40 ML: 9 INJECTION, SOLUTION INTRAVENOUS at 20:37

## 2023-11-05 RX ADMIN — SODIUM CHLORIDE, PRESERVATIVE FREE 0 ML: 5 INJECTION INTRAVENOUS at 05:40

## 2023-11-05 RX ADMIN — SODIUM CHLORIDE, PRESERVATIVE FREE 0 ML: 5 INJECTION INTRAVENOUS at 13:28

## 2023-11-05 RX ADMIN — PIPERACILLIN SODIUM AND TAZOBACTAM SODIUM 12.5 GM: 3; .375 INJECTION, POWDER, LYOPHILIZED, FOR SOLUTION INTRAVENOUS at 13:28

## 2023-11-05 RX ADMIN — PANTOPRAZOLE SODIUM 330 MG: 40 INJECTION, POWDER, FOR SOLUTION INTRAVENOUS at 10:17

## 2023-11-05 RX ADMIN — SODIUM CHLORIDE, PRESERVATIVE FREE 0 ML: 5 INJECTION INTRAVENOUS at 12:45

## 2023-11-06 VITALS
DIASTOLIC BLOOD PRESSURE: 73 MMHG | SYSTOLIC BLOOD PRESSURE: 134 MMHG | RESPIRATION RATE: 16 BRPM | HEART RATE: 64 BPM | TEMPERATURE: 97.52 F | OXYGEN SATURATION: 100 %

## 2023-11-06 VITALS
HEART RATE: 72 BPM | OXYGEN SATURATION: 97 % | RESPIRATION RATE: 18 BRPM | TEMPERATURE: 98.3 F | SYSTOLIC BLOOD PRESSURE: 124 MMHG | DIASTOLIC BLOOD PRESSURE: 76 MMHG

## 2023-11-06 VITALS
DIASTOLIC BLOOD PRESSURE: 84 MMHG | TEMPERATURE: 98.42 F | RESPIRATION RATE: 16 BRPM | OXYGEN SATURATION: 97 % | HEART RATE: 69 BPM | SYSTOLIC BLOOD PRESSURE: 124 MMHG

## 2023-11-06 RX ADMIN — SODIUM CHLORIDE, PRESERVATIVE FREE 0 ML: 5 INJECTION INTRAVENOUS at 00:44

## 2023-11-06 RX ADMIN — PIPERACILLIN SODIUM AND TAZOBACTAM SODIUM 12.5 GM: 3; .375 INJECTION, POWDER, LYOPHILIZED, FOR SOLUTION INTRAVENOUS at 06:39

## 2023-11-21 DIAGNOSIS — J45.909 ASTHMA, UNSPECIFIED ASTHMA SEVERITY, UNSPECIFIED WHETHER COMPLICATED, UNSPECIFIED WHETHER PERSISTENT (HHS-HCC): Primary | ICD-10-CM

## 2023-11-21 RX ORDER — BUDESONIDE AND FORMOTEROL FUMARATE DIHYDRATE 160; 4.5 UG/1; UG/1
2 AEROSOL RESPIRATORY (INHALATION)
Qty: 1 EACH | Refills: 11 | Status: SHIPPED | OUTPATIENT
Start: 2023-11-21 | End: 2024-01-12 | Stop reason: SDUPTHER

## 2024-01-11 ENCOUNTER — HOSPITAL ENCOUNTER (OUTPATIENT)
Dept: HOSPITAL 100 - EN | Age: 74
Discharge: HOME | End: 2024-01-11
Payer: MEDICARE

## 2024-01-11 VITALS — BODY MASS INDEX: 29.2 KG/M2

## 2024-01-11 VITALS — DIASTOLIC BLOOD PRESSURE: 79 MMHG | SYSTOLIC BLOOD PRESSURE: 113 MMHG

## 2024-01-11 VITALS
RESPIRATION RATE: 16 BRPM | SYSTOLIC BLOOD PRESSURE: 93 MMHG | HEART RATE: 72 BPM | DIASTOLIC BLOOD PRESSURE: 79 MMHG | OXYGEN SATURATION: 95 %

## 2024-01-11 VITALS
RESPIRATION RATE: 16 BRPM | SYSTOLIC BLOOD PRESSURE: 113 MMHG | HEART RATE: 85 BPM | OXYGEN SATURATION: 94 % | DIASTOLIC BLOOD PRESSURE: 79 MMHG

## 2024-01-11 VITALS
HEART RATE: 83 BPM | DIASTOLIC BLOOD PRESSURE: 79 MMHG | OXYGEN SATURATION: 95 % | SYSTOLIC BLOOD PRESSURE: 113 MMHG | RESPIRATION RATE: 16 BRPM

## 2024-01-11 VITALS
HEART RATE: 82 BPM | OXYGEN SATURATION: 96 % | SYSTOLIC BLOOD PRESSURE: 113 MMHG | DIASTOLIC BLOOD PRESSURE: 58 MMHG | TEMPERATURE: 97.1 F | RESPIRATION RATE: 16 BRPM

## 2024-01-11 VITALS
HEART RATE: 70 BPM | RESPIRATION RATE: 16 BRPM | OXYGEN SATURATION: 96 % | DIASTOLIC BLOOD PRESSURE: 71 MMHG | SYSTOLIC BLOOD PRESSURE: 95 MMHG | TEMPERATURE: 97.34 F

## 2024-01-11 VITALS
SYSTOLIC BLOOD PRESSURE: 113 MMHG | TEMPERATURE: 98.06 F | HEART RATE: 84 BPM | RESPIRATION RATE: 18 BRPM | OXYGEN SATURATION: 99 % | DIASTOLIC BLOOD PRESSURE: 79 MMHG

## 2024-01-11 DIAGNOSIS — E11.9: ICD-10-CM

## 2024-01-11 DIAGNOSIS — K64.1: ICD-10-CM

## 2024-01-11 DIAGNOSIS — K63.1: ICD-10-CM

## 2024-01-11 DIAGNOSIS — I10: ICD-10-CM

## 2024-01-11 DIAGNOSIS — Z90.49: ICD-10-CM

## 2024-01-11 DIAGNOSIS — K64.4: ICD-10-CM

## 2024-01-11 DIAGNOSIS — K62.89: Primary | ICD-10-CM

## 2024-01-11 DIAGNOSIS — D17.79: ICD-10-CM

## 2024-01-11 DIAGNOSIS — Z79.899: ICD-10-CM

## 2024-01-11 DIAGNOSIS — Z98.0: ICD-10-CM

## 2024-01-11 DIAGNOSIS — K63.5: ICD-10-CM

## 2024-01-11 DIAGNOSIS — J45.909: ICD-10-CM

## 2024-01-11 DIAGNOSIS — K57.32: ICD-10-CM

## 2024-01-11 PROCEDURE — 0DJD8ZZ INSPECTION OF LOWER INTESTINAL TRACT, VIA NATURAL OR ARTIFICIAL OPENING ENDOSCOPIC: ICD-10-PCS | Performed by: STUDENT IN AN ORGANIZED HEALTH CARE EDUCATION/TRAINING PROGRAM

## 2024-01-11 PROCEDURE — 88305 TISSUE EXAM BY PATHOLOGIST: CPT

## 2024-01-11 PROCEDURE — 45385 COLONOSCOPY W/LESION REMOVAL: CPT

## 2024-01-11 PROCEDURE — 45380 COLONOSCOPY AND BIOPSY: CPT

## 2024-01-12 DIAGNOSIS — J45.909 ASTHMA, UNSPECIFIED ASTHMA SEVERITY, UNSPECIFIED WHETHER COMPLICATED, UNSPECIFIED WHETHER PERSISTENT (HHS-HCC): ICD-10-CM

## 2024-01-12 RX ORDER — BUDESONIDE AND FORMOTEROL FUMARATE DIHYDRATE 160; 4.5 UG/1; UG/1
2 AEROSOL RESPIRATORY (INHALATION)
Qty: 1 EACH | Refills: 11 | Status: SHIPPED | OUTPATIENT
Start: 2024-01-12 | End: 2025-01-11

## 2024-02-10 DIAGNOSIS — J30.89 NON-SEASONAL ALLERGIC RHINITIS, UNSPECIFIED TRIGGER: Primary | ICD-10-CM

## 2024-02-12 RX ORDER — AZELASTINE 1 MG/ML
SPRAY, METERED NASAL
Qty: 30 ML | Refills: 0 | Status: SHIPPED | OUTPATIENT
Start: 2024-02-12

## 2024-02-29 LAB
ALANINE AMINOTRANSFER ALT/SGPT: 20 U/L (ref 16–61)
ALBUMIN SERPL-MCNC: 3.7 G/DL (ref 3.2–5)
ALKALINE PHOSPHATASE: 86 U/L (ref 45–117)
APTT PPP: 32 SECONDS (ref 24.1–36.2)
AST(SGOT): 21 U/L (ref 15–37)
BILIRUB DIRECT SERPL-MCNC: 0.18 MG/DL (ref 0–0.3)
DEPRECATED RDW RBC: 44.7 FL (ref 35.1–43.9)
ERYTHROCYTE [DISTWIDTH] IN BLOOD: 14 % (ref 11.6–14.6)
GLOBULIN: 3.7 G/DL (ref 2.2–4.2)
HCT VFR BLD AUTO: 46.4 % (ref 40–54)
HGB BLD-MCNC: 14.9 G/DL (ref 13–16.5)
MAGNESIUM: 2.3 MG/DL (ref 1.6–2.6)
MCV RBC: 88.5 FL (ref 80–94)
MEAN CORP HGB CONC: 32.1 G/DL (ref 32–36)
MEAN PLATELET VOL.: 8.9 FL (ref 6.2–12)
PLATELET # BLD: 232 K/MM3 (ref 150–450)
PROTHROMBIN TIME (PROTIME)PT.: 13 SECONDS (ref 11.7–14.9)
RBC # BLD AUTO: 5.24 M/MM3 (ref 4.6–6.2)
WBC # BLD AUTO: 7.6 K/MM3 (ref 4.4–11)

## 2024-03-06 ENCOUNTER — HOSPITAL ENCOUNTER (INPATIENT)
Dept: HOSPITAL 100 - ACINP | Age: 74
LOS: 3 days | Discharge: HOME | DRG: 330 | End: 2024-03-09
Payer: MEDICARE

## 2024-03-06 VITALS
SYSTOLIC BLOOD PRESSURE: 148 MMHG | DIASTOLIC BLOOD PRESSURE: 90 MMHG | RESPIRATION RATE: 15 BRPM | OXYGEN SATURATION: 99 % | TEMPERATURE: 97.5 F | HEART RATE: 88 BPM

## 2024-03-06 VITALS
HEART RATE: 82 BPM | OXYGEN SATURATION: 99 % | RESPIRATION RATE: 17 BRPM | DIASTOLIC BLOOD PRESSURE: 89 MMHG | SYSTOLIC BLOOD PRESSURE: 138 MMHG

## 2024-03-06 VITALS
TEMPERATURE: 97.7 F | RESPIRATION RATE: 17 BRPM | SYSTOLIC BLOOD PRESSURE: 121 MMHG | HEART RATE: 84 BPM | DIASTOLIC BLOOD PRESSURE: 91 MMHG | OXYGEN SATURATION: 100 %

## 2024-03-06 VITALS
OXYGEN SATURATION: 98 % | RESPIRATION RATE: 15 BRPM | SYSTOLIC BLOOD PRESSURE: 123 MMHG | DIASTOLIC BLOOD PRESSURE: 82 MMHG | TEMPERATURE: 97.88 F | HEART RATE: 87 BPM

## 2024-03-06 VITALS
RESPIRATION RATE: 16 BRPM | HEART RATE: 83 BPM | TEMPERATURE: 97.4 F | OXYGEN SATURATION: 99 % | DIASTOLIC BLOOD PRESSURE: 91 MMHG | SYSTOLIC BLOOD PRESSURE: 143 MMHG

## 2024-03-06 VITALS
SYSTOLIC BLOOD PRESSURE: 129 MMHG | HEART RATE: 79 BPM | DIASTOLIC BLOOD PRESSURE: 91 MMHG | TEMPERATURE: 97.7 F | RESPIRATION RATE: 16 BRPM | OXYGEN SATURATION: 100 %

## 2024-03-06 VITALS
SYSTOLIC BLOOD PRESSURE: 135 MMHG | HEART RATE: 82 BPM | OXYGEN SATURATION: 100 % | RESPIRATION RATE: 16 BRPM | DIASTOLIC BLOOD PRESSURE: 91 MMHG

## 2024-03-06 VITALS
OXYGEN SATURATION: 97 % | TEMPERATURE: 97.88 F | HEART RATE: 82 BPM | SYSTOLIC BLOOD PRESSURE: 154 MMHG | DIASTOLIC BLOOD PRESSURE: 95 MMHG | RESPIRATION RATE: 16 BRPM

## 2024-03-06 VITALS
SYSTOLIC BLOOD PRESSURE: 127 MMHG | OXYGEN SATURATION: 100 % | DIASTOLIC BLOOD PRESSURE: 87 MMHG | RESPIRATION RATE: 16 BRPM | HEART RATE: 80 BPM

## 2024-03-06 VITALS
DIASTOLIC BLOOD PRESSURE: 89 MMHG | SYSTOLIC BLOOD PRESSURE: 143 MMHG | OXYGEN SATURATION: 98 % | HEART RATE: 77 BPM | RESPIRATION RATE: 16 BRPM

## 2024-03-06 VITALS — BODY MASS INDEX: 29 KG/M2

## 2024-03-06 VITALS — RESPIRATION RATE: 15 BRPM

## 2024-03-06 DIAGNOSIS — L76.32: ICD-10-CM

## 2024-03-06 DIAGNOSIS — Z43.3: Primary | ICD-10-CM

## 2024-03-06 DIAGNOSIS — Z90.49: ICD-10-CM

## 2024-03-06 DIAGNOSIS — I10: ICD-10-CM
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
## 2024-03-06 DIAGNOSIS — R33.9: ICD-10-CM

## 2024-03-06 DIAGNOSIS — T45.515A: ICD-10-CM

## 2024-03-06 DIAGNOSIS — Z79.899: ICD-10-CM
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
## 2024-03-06 DIAGNOSIS — K40.90: ICD-10-CM

## 2024-03-06 DIAGNOSIS — E11.9: ICD-10-CM

## 2024-03-06 PROCEDURE — 80076 HEPATIC FUNCTION PANEL: CPT

## 2024-03-06 PROCEDURE — 88307 TISSUE EXAM BY PATHOLOGIST: CPT

## 2024-03-06 PROCEDURE — A4216 STERILE WATER/SALINE, 10 ML: HCPCS

## 2024-03-06 PROCEDURE — 82962 GLUCOSE BLOOD TEST: CPT

## 2024-03-06 PROCEDURE — 94668 MNPJ CHEST WALL SBSQ: CPT

## 2024-03-06 PROCEDURE — C1760 CLOSURE DEV, VASC: HCPCS

## 2024-03-06 PROCEDURE — 83735 ASSAY OF MAGNESIUM: CPT

## 2024-03-06 PROCEDURE — 88304 TISSUE EXAM BY PATHOLOGIST: CPT

## 2024-03-06 PROCEDURE — G0463 HOSPITAL OUTPT CLINIC VISIT: HCPCS

## 2024-03-06 PROCEDURE — 99252 IP/OBS CONSLTJ NEW/EST SF 35: CPT

## 2024-03-06 PROCEDURE — 0D1E4Z4 BYPASS LARGE INTESTINE TO CUTANEOUS, PERCUTANEOUS ENDOSCOPIC APPROACH: ICD-10-PCS | Performed by: STUDENT IN AN ORGANIZED HEALTH CARE EDUCATION/TRAINING PROGRAM

## 2024-03-06 PROCEDURE — 80048 BASIC METABOLIC PNL TOTAL CA: CPT

## 2024-03-06 PROCEDURE — 85025 COMPLETE CBC W/AUTO DIFF WBC: CPT

## 2024-03-06 PROCEDURE — 84100 ASSAY OF PHOSPHORUS: CPT

## 2024-03-06 PROCEDURE — 36415 COLL VENOUS BLD VENIPUNCTURE: CPT

## 2024-03-06 PROCEDURE — 85610 PROTHROMBIN TIME: CPT

## 2024-03-06 PROCEDURE — 85730 THROMBOPLASTIN TIME PARTIAL: CPT

## 2024-03-06 PROCEDURE — 85027 COMPLETE CBC AUTOMATED: CPT

## 2024-03-06 RX ADMIN — SODIUM CHLORIDE 75 ML: 9 INJECTION, SOLUTION INTRAVENOUS at 18:00

## 2024-03-06 RX ADMIN — CEFOTETAN DISODIUM 200 GM: 2 INJECTION, POWDER, FOR SOLUTION INTRAMUSCULAR; INTRAVENOUS at 10:30

## 2024-03-06 RX ADMIN — MAGNESIUM SULFATE HEPTAHYDRATE 408 GM: 500 INJECTION, SOLUTION INTRAMUSCULAR; INTRAVENOUS at 09:10

## 2024-03-06 RX ADMIN — SODIUM CHLORIDE 10 ML: 9 INJECTION, SOLUTION INTRAMUSCULAR; INTRAVENOUS; SUBCUTANEOUS at 15:15

## 2024-03-06 RX ADMIN — Medication 30 GM: at 12:00

## 2024-03-06 RX ADMIN — BUPIVACAINE 20 ML: 13.3 INJECTION, SUSPENSION, LIPOSOMAL INFILTRATION at 15:15

## 2024-03-07 VITALS
TEMPERATURE: 98.8 F | RESPIRATION RATE: 16 BRPM | OXYGEN SATURATION: 98 % | SYSTOLIC BLOOD PRESSURE: 119 MMHG | HEART RATE: 86 BPM | DIASTOLIC BLOOD PRESSURE: 74 MMHG

## 2024-03-07 VITALS
SYSTOLIC BLOOD PRESSURE: 124 MMHG | DIASTOLIC BLOOD PRESSURE: 74 MMHG | TEMPERATURE: 97.8 F | OXYGEN SATURATION: 93 % | HEART RATE: 61 BPM | RESPIRATION RATE: 16 BRPM

## 2024-03-07 VITALS
HEART RATE: 81 BPM | DIASTOLIC BLOOD PRESSURE: 67 MMHG | TEMPERATURE: 98.24 F | SYSTOLIC BLOOD PRESSURE: 113 MMHG | OXYGEN SATURATION: 97 % | RESPIRATION RATE: 15 BRPM

## 2024-03-07 VITALS
OXYGEN SATURATION: 99 % | SYSTOLIC BLOOD PRESSURE: 121 MMHG | TEMPERATURE: 98.2 F | RESPIRATION RATE: 14 BRPM | HEART RATE: 82 BPM | DIASTOLIC BLOOD PRESSURE: 77 MMHG

## 2024-03-07 VITALS — RESPIRATION RATE: 15 BRPM

## 2024-03-07 LAB
ANION GAP: 7 (ref 5–15)
BUN SERPL-MCNC: 16 MG/DL (ref 7–18)
BUN/CREAT RATIO: 13.1 RATIO (ref 10–20)
CALCIUM SERPL-MCNC: 8 MG/DL (ref 8.5–10.1)
CARBON DIOXIDE: 27 MMOL/L (ref 21–32)
CHLORIDE: 104 MMOL/L (ref 98–107)
DEPRECATED RDW RBC: 45.9 FL (ref 35.1–43.9)
ERYTHROCYTE [DISTWIDTH] IN BLOOD: 14 % (ref 11.6–14.6)
EST GLOM FILT RATE - AFR AMER: 75 ML/MIN (ref 60–?)
ESTIMATED CREATININE CLEARANCE: 57.95 ML/MIN
GLUCOSE: 96 MG/DL (ref 74–106)
HCT VFR BLD AUTO: 42.9 % (ref 40–54)
HGB BLD-MCNC: 13.7 G/DL (ref 13–16.5)
IMMATURE GRANULOCYTES COUNT: 0.03 X10^3/UL (ref 0–0)
MAGNESIUM: 2.1 MG/DL (ref 1.6–2.6)
MCV RBC: 89.2 FL (ref 80–94)
MEAN CORP HGB CONC: 31.9 G/DL (ref 32–36)
MEAN PLATELET VOL.: 9.7 FL (ref 6.2–12)
NRBC FLAGGED BY ANALYZER: 0 % (ref 0–5)
PLATELET # BLD: 200 K/MM3 (ref 150–450)
POTASSIUM: 4 MMOL/L (ref 3.5–5.1)
RBC # BLD AUTO: 4.81 M/MM3 (ref 4.6–6.2)
WBC # BLD AUTO: 11.3 K/MM3 (ref 4.4–11)

## 2024-03-07 RX ADMIN — SODIUM CHLORIDE 75 ML: 9 INJECTION, SOLUTION INTRAVENOUS at 11:36

## 2024-03-07 RX ADMIN — SODIUM CHLORIDE 75 ML: 9 INJECTION, SOLUTION INTRAVENOUS at 17:43

## 2024-03-07 RX ADMIN — SODIUM CHLORIDE, PRESERVATIVE FREE 0 ML: 5 INJECTION INTRAVENOUS at 11:40

## 2024-03-08 VITALS
HEART RATE: 80 BPM | DIASTOLIC BLOOD PRESSURE: 77 MMHG | SYSTOLIC BLOOD PRESSURE: 139 MMHG | RESPIRATION RATE: 16 BRPM | TEMPERATURE: 98.8 F | OXYGEN SATURATION: 97 %

## 2024-03-08 VITALS
OXYGEN SATURATION: 98 % | TEMPERATURE: 98.1 F | DIASTOLIC BLOOD PRESSURE: 69 MMHG | RESPIRATION RATE: 15 BRPM | SYSTOLIC BLOOD PRESSURE: 108 MMHG | HEART RATE: 80 BPM

## 2024-03-08 VITALS
OXYGEN SATURATION: 98 % | DIASTOLIC BLOOD PRESSURE: 73 MMHG | SYSTOLIC BLOOD PRESSURE: 128 MMHG | RESPIRATION RATE: 16 BRPM | TEMPERATURE: 98.9 F | HEART RATE: 90 BPM

## 2024-03-08 VITALS
OXYGEN SATURATION: 99 % | RESPIRATION RATE: 15 BRPM | TEMPERATURE: 98 F | HEART RATE: 71 BPM | DIASTOLIC BLOOD PRESSURE: 84 MMHG | SYSTOLIC BLOOD PRESSURE: 128 MMHG

## 2024-03-08 LAB
ANION GAP: 6 (ref 5–15)
BUN SERPL-MCNC: 13 MG/DL (ref 7–18)
BUN/CREAT RATIO: 10.8 RATIO (ref 10–20)
CALCIUM SERPL-MCNC: 8.1 MG/DL (ref 8.5–10.1)
CARBON DIOXIDE: 26 MMOL/L (ref 21–32)
CHLORIDE: 106 MMOL/L (ref 98–107)
DEPRECATED RDW RBC: 45 FL (ref 35.1–43.9)
ERYTHROCYTE [DISTWIDTH] IN BLOOD: 13.8 % (ref 11.6–14.6)
EST GLOM FILT RATE - AFR AMER: 77 ML/MIN (ref 60–?)
ESTIMATED CREATININE CLEARANCE: 58.91 ML/MIN
GLUCOSE: 128 MG/DL (ref 74–106)
HCT VFR BLD AUTO: 36.5 % (ref 40–54)
HGB BLD-MCNC: 11.8 G/DL (ref 13–16.5)
IMMATURE GRANULOCYTES COUNT: 0.04 X10^3/UL (ref 0–0)
MCV RBC: 89.5 FL (ref 80–94)
MEAN CORP HGB CONC: 32.3 G/DL (ref 32–36)
MEAN PLATELET VOL.: 9.5 FL (ref 6.2–12)
NRBC FLAGGED BY ANALYZER: 0 % (ref 0–5)
PLATELET # BLD: 170 K/MM3 (ref 150–450)
POTASSIUM: 3.9 MMOL/L (ref 3.5–5.1)
RBC # BLD AUTO: 4.08 M/MM3 (ref 4.6–6.2)
WBC # BLD AUTO: 10.9 K/MM3 (ref 4.4–11)

## 2024-03-08 RX ADMIN — SODIUM CHLORIDE, PRESERVATIVE FREE 0 ML: 5 INJECTION INTRAVENOUS at 23:53

## 2024-03-08 RX ADMIN — SODIUM CHLORIDE, PRESERVATIVE FREE 0 ML: 5 INJECTION INTRAVENOUS at 17:46

## 2024-03-08 RX ADMIN — SODIUM CHLORIDE 75 ML: 9 INJECTION, SOLUTION INTRAVENOUS at 06:00

## 2024-03-08 RX ADMIN — Medication 120 ML: at 17:46

## 2024-03-08 RX ADMIN — Medication 120 ML: at 20:09

## 2024-03-08 RX ADMIN — SODIUM CHLORIDE, PRESERVATIVE FREE 0 ML: 5 INJECTION INTRAVENOUS at 12:44

## 2024-03-09 VITALS
SYSTOLIC BLOOD PRESSURE: 142 MMHG | DIASTOLIC BLOOD PRESSURE: 87 MMHG | RESPIRATION RATE: 18 BRPM | HEART RATE: 76 BPM | OXYGEN SATURATION: 99 % | TEMPERATURE: 97.9 F

## 2024-03-09 VITALS
OXYGEN SATURATION: 98 % | RESPIRATION RATE: 16 BRPM | DIASTOLIC BLOOD PRESSURE: 75 MMHG | SYSTOLIC BLOOD PRESSURE: 128 MMHG | TEMPERATURE: 97.88 F | HEART RATE: 83 BPM

## 2024-03-09 VITALS
RESPIRATION RATE: 16 BRPM | TEMPERATURE: 97.88 F | OXYGEN SATURATION: 97 % | SYSTOLIC BLOOD PRESSURE: 120 MMHG | HEART RATE: 71 BPM | DIASTOLIC BLOOD PRESSURE: 79 MMHG

## 2024-03-09 RX ADMIN — Medication 120 ML: at 09:29

## 2024-03-09 RX ADMIN — SODIUM CHLORIDE, PRESERVATIVE FREE 0 ML: 5 INJECTION INTRAVENOUS at 06:33

## 2024-07-12 ENCOUNTER — OFFICE VISIT (OUTPATIENT)
Dept: PULMONOLOGY | Facility: HOSPITAL | Age: 74
End: 2024-07-12
Payer: MEDICARE

## 2024-07-12 VITALS
HEIGHT: 68 IN | OXYGEN SATURATION: 96 % | WEIGHT: 192.7 LBS | RESPIRATION RATE: 16 BRPM | SYSTOLIC BLOOD PRESSURE: 132 MMHG | TEMPERATURE: 98.2 F | DIASTOLIC BLOOD PRESSURE: 81 MMHG | HEART RATE: 73 BPM | BODY MASS INDEX: 29.21 KG/M2

## 2024-07-12 DIAGNOSIS — J30.9 ALLERGIC RHINITIS, UNSPECIFIED SEASONALITY, UNSPECIFIED TRIGGER: ICD-10-CM

## 2024-07-12 DIAGNOSIS — J45.909 ASTHMA, UNSPECIFIED ASTHMA SEVERITY, UNSPECIFIED WHETHER COMPLICATED, UNSPECIFIED WHETHER PERSISTENT (HHS-HCC): Primary | ICD-10-CM

## 2024-07-12 PROCEDURE — 1159F MED LIST DOCD IN RCRD: CPT | Performed by: NURSE PRACTITIONER

## 2024-07-12 PROCEDURE — 99213 OFFICE O/P EST LOW 20 MIN: CPT | Performed by: NURSE PRACTITIONER

## 2024-07-12 PROCEDURE — 1036F TOBACCO NON-USER: CPT | Performed by: NURSE PRACTITIONER

## 2024-07-12 RX ORDER — ALBUTEROL SULFATE 0.83 MG/ML
SOLUTION RESPIRATORY (INHALATION)
COMMUNITY
Start: 2021-11-24

## 2024-07-12 RX ORDER — FLUTICASONE PROPIONATE 50 MCG
2 SPRAY, SUSPENSION (ML) NASAL DAILY
COMMUNITY

## 2024-07-12 RX ORDER — CALCIUM CARBONATE 600 MG
TABLET ORAL EVERY 24 HOURS
COMMUNITY

## 2024-07-12 RX ORDER — ALBUTEROL SULFATE 90 UG/1
AEROSOL, METERED RESPIRATORY (INHALATION)
COMMUNITY
Start: 2021-09-14

## 2024-07-12 RX ORDER — MONTELUKAST SODIUM 10 MG/1
TABLET ORAL EVERY 24 HOURS
COMMUNITY
Start: 2021-06-11

## 2024-07-12 RX ORDER — LOSARTAN POTASSIUM 50 MG/1
25 TABLET ORAL DAILY
COMMUNITY

## 2024-07-12 RX ORDER — CHOLECALCIFEROL (VITAMIN D3) 125 MCG
CAPSULE ORAL EVERY 24 HOURS
COMMUNITY

## 2024-07-12 ASSESSMENT — ENCOUNTER SYMPTOMS
RHINORRHEA: 0
FEVER: 0
SHORTNESS OF BREATH: 0
CHILLS: 0
COUGH: 0
UNEXPECTED WEIGHT CHANGE: 0
WHEEZING: 0
FATIGUE: 0

## 2024-07-12 ASSESSMENT — COLUMBIA-SUICIDE SEVERITY RATING SCALE - C-SSRS
1. IN THE PAST MONTH, HAVE YOU WISHED YOU WERE DEAD OR WISHED YOU COULD GO TO SLEEP AND NOT WAKE UP?: NO
6. HAVE YOU EVER DONE ANYTHING, STARTED TO DO ANYTHING, OR PREPARED TO DO ANYTHING TO END YOUR LIFE?: NO
2. HAVE YOU ACTUALLY HAD ANY THOUGHTS OF KILLING YOURSELF?: NO

## 2024-07-12 ASSESSMENT — PATIENT HEALTH QUESTIONNAIRE - PHQ9
2. FEELING DOWN, DEPRESSED OR HOPELESS: NOT AT ALL
SUM OF ALL RESPONSES TO PHQ9 QUESTIONS 1 AND 2: 0
1. LITTLE INTEREST OR PLEASURE IN DOING THINGS: NOT AT ALL

## 2024-07-12 NOTE — PROGRESS NOTES
Subjective   Patient ID: Mark Meza is a 74 y.o. male who presents for follow up asthma.     HPI: Patient has PMH of asthma, allergic rhinitis, and nasal polyps. He was referred for asthma management. He is having surgery to remove polyps the first week of January. He states that his first diagnosis with asthma was about ten or so years ago. He gets short of breath on more than ordinary activity. He states that his breathing has been worse since July and the only thing that helps is prednisone. He has a daily cough that is mostly dry but is productive at times. He is not on any maintenance inhaler and using albuterol along with nebulizers several times per day. He denies any fever, chills, chest pain, leg swelling, or hemoptysis. He has never smoked but was exposed to second hand smoke growing up. He also reports that he was exposed to asbestos. He used to work in a machine shop.      He reports he just had surgery for nasal polyps by Dr. Valdemar WOLF in [place]. He reports he used to have coughing spells for hours and now he is better. He notes significant improvement with Trelegy, montelukast, flonase and xyzal. He was also given prednisone 5 mg daily which he is off since Dec 2021. His PFTs were normal but showed BDR and normal 6MWT. His RAST showed + for dust mites but Eos were 100 done on prednisone. He reports he was doing better but his nasal polyps are returned. He is having more nasal congestion but asthma has not flared up. He takes occasional Ibuprofen for pain.     He reports he is not having any issues and denies shortness of breath, cough or wheezing. Patient was started on Dupixent in December and had multiple adverse effects. Patient developed muscle aches, Achilles sprain, had a fall that tore his Achillis. Patient fainted and then in April developed pneumonia. After the pneumonia patient had an ongoing chronic cough that was terrible and PCP gave him a kenalog shot and he has been great since.  "Patient states he was retested for allergies and he did not flag enough to need any injections so the ENT stopped those. Patient states his breathing is pretty good. Patient has an occasional clearing his throat from possible drainage but no actual cough. Patient has not needed rescue inhaler or the nebulizer. Patient has no other concerns for today. \"I am so happy not be coughing anymore. I have coughed so much in my life.\"     Today he is here for follow up. He states that his breathing has been great and has not needed albuterol in several years, he is interested in trying to come off of the Symbicort. He does not need Xyxal any longer. He follows with ENT. He has no other concerns.     Review of Systems   Constitutional:  Negative for chills, fatigue, fever and unexpected weight change.   HENT:  Positive for congestion. Negative for postnasal drip and rhinorrhea.    Respiratory:  Negative for cough (denies hemoptysis.), shortness of breath and wheezing.    Cardiovascular:  Negative for chest pain and leg swelling.   All other systems reviewed and are negative.      Objective   Physical Exam  Vitals reviewed.   Constitutional:       Appearance: Normal appearance.   HENT:      Head: Normocephalic.   Cardiovascular:      Rate and Rhythm: Normal rate and regular rhythm.   Pulmonary:      Effort: Pulmonary effort is normal.      Breath sounds: Normal breath sounds.   Skin:     General: Skin is warm and dry.   Neurological:      Mental Status: He is alert.         Assessment/Plan   1. Bronchial asthma  2. Allergic rhinitis  3. Asbestos exposure   4. Nasal polyps  5. Dust mites allergy     Plan:     -PFTs with significant BDR suggesting asthma but normal FEV1. Continue Symbicort 2 puffs BID and albuterol prn and Duonebs prn. He stopped prednisone 5 mg daily in Dec 2021 and did not have worsening of symptoms. Denies having frequent worsening of symptoms but states had mild asthma exacerbation that was treated with " Prednisone in Spring 2023 and also got Kenalog shot in May 2023 with PCP. He is going to try stopping Symbicort and will notify me of worsening shortness of breath or cough.     -, RAST with mild + for dust mites. Continu Montelukast and Flonase. He has not been taking xyzal and has noted recurrence of polyps despite polypectomy. Avoid NSAIDs. He did take Dupixent x 3 months with ENT but pt states it did not help him and after Dec 2022 he had reaction.      -He has a known asbestos exposure in 1970, I reviewed CXR from August and lungs were clear.      Overall his asthma is controlled and he would like to stop Symbicort if possible, I instructed him to take once a day and if no worsening he can try stopping. I will bring him back with us in one year, if he is not having any worsening cough or shortness of breath he can see us as needed. I instructed patient to call sooner if needed.

## 2024-07-12 NOTE — PATIENT INSTRUCTIONS
You can try stopping Symbicort as discussed.   Continue albuterol as needed.   Continue Montelukast, Flonase, and Azelastine as discussed.  Call with any questions or concerns.   Follow up with Dr. Lebron in one year.

## 2025-02-16 ENCOUNTER — HOSPITAL ENCOUNTER (EMERGENCY)
Age: 75
Discharge: HOME | End: 2025-02-16
Payer: MEDICARE

## 2025-02-16 VITALS
TEMPERATURE: 98.9 F | OXYGEN SATURATION: 98 % | HEART RATE: 95 BPM | SYSTOLIC BLOOD PRESSURE: 109 MMHG | RESPIRATION RATE: 24 BRPM | DIASTOLIC BLOOD PRESSURE: 87 MMHG

## 2025-02-16 VITALS
HEART RATE: 80 BPM | DIASTOLIC BLOOD PRESSURE: 88 MMHG | OXYGEN SATURATION: 98 % | TEMPERATURE: 98.6 F | RESPIRATION RATE: 15 BRPM | SYSTOLIC BLOOD PRESSURE: 154 MMHG

## 2025-02-16 VITALS
OXYGEN SATURATION: 96 % | HEART RATE: 86 BPM | RESPIRATION RATE: 15 BRPM | DIASTOLIC BLOOD PRESSURE: 67 MMHG | SYSTOLIC BLOOD PRESSURE: 139 MMHG

## 2025-02-16 VITALS
TEMPERATURE: 97.6 F | DIASTOLIC BLOOD PRESSURE: 94 MMHG | RESPIRATION RATE: 16 BRPM | SYSTOLIC BLOOD PRESSURE: 105 MMHG | OXYGEN SATURATION: 96 % | HEART RATE: 87 BPM

## 2025-02-16 VITALS
TEMPERATURE: 98.4 F | SYSTOLIC BLOOD PRESSURE: 130 MMHG | DIASTOLIC BLOOD PRESSURE: 89 MMHG | HEART RATE: 78 BPM | RESPIRATION RATE: 15 BRPM | OXYGEN SATURATION: 98 %

## 2025-02-16 VITALS — OXYGEN SATURATION: 93 %

## 2025-02-16 VITALS — BODY MASS INDEX: 30.8 KG/M2

## 2025-02-16 VITALS — RESPIRATION RATE: 18 BRPM | HEART RATE: 100 BPM

## 2025-02-16 DIAGNOSIS — J45.901: Primary | ICD-10-CM

## 2025-02-16 DIAGNOSIS — Z90.49: ICD-10-CM

## 2025-02-16 DIAGNOSIS — I10: ICD-10-CM

## 2025-02-16 DIAGNOSIS — R06.00: ICD-10-CM

## 2025-02-16 DIAGNOSIS — Z93.3: ICD-10-CM

## 2025-02-16 DIAGNOSIS — E11.9: ICD-10-CM

## 2025-02-16 DIAGNOSIS — Z79.51: ICD-10-CM

## 2025-02-16 LAB
ANION GAP: 8 (ref 5–15)
BUN SERPL-MCNC: 20 MG/DL (ref 7–18)
BUN/CREAT RATIO: 15.6 RATIO (ref 10–20)
CALCIUM SERPL-MCNC: 9.1 MG/DL (ref 8.5–10.1)
CARBON DIOXIDE: 25 MMOL/L (ref 21–32)
CHLORIDE: 107 MMOL/L (ref 98–107)
CREAT SERPL-MCNC: 1.28 MG/DL (ref 0.7–1.3)
DEPRECATED RDW RBC: 42.4 FL (ref 35.1–43.9)
ERYTHROCYTE [DISTWIDTH] IN BLOOD: 13.3 % (ref 11.6–14.6)
EST GLOM FILT RATE - AFR AMER: 71 ML/MIN (ref 60–?)
ESTIMATED CREATININE CLEARANCE: 56.04 ML/MIN
GLUCOSE: 115 MG/DL (ref 74–106)
HCT VFR BLD AUTO: 46.2 % (ref 40–54)
HEMOGLOBIN: 15.5 G/DL (ref 13–16.5)
HGB BLD-MCNC: 15.5 G/DL (ref 13–16.5)
IMMATURE GRANULOCYTES COUNT: 0.03 X10^3/UL (ref 0–0)
MCV RBC: 87 FL (ref 80–94)
MEAN CORP HGB CONC: 33.5 G/DL (ref 32–36)
MEAN PLATELET VOL.: 9.5 FL (ref 6.2–12)
NRBC FLAGGED BY ANALYZER: 0 % (ref 0–5)
PLATELET # BLD: 235 K/MM3 (ref 150–450)
PLATELET COUNT: 235 K/MM3 (ref 150–450)
POTASSIUM: 3.9 MMOL/L (ref 3.5–5.1)
RBC # BLD AUTO: 5.31 M/MM3 (ref 4.6–6.2)
RBC DISTRIBUTION WIDTH CV: 13.3 % (ref 11.6–14.6)
RBC DISTRIBUTION WIDTH SD: 42.4 FL (ref 35.1–43.9)
WBC # BLD AUTO: 8.9 K/MM3 (ref 4.4–11)
WHITE BLOOD COUNT: 8.9 K/MM3 (ref 4.4–11)

## 2025-02-16 PROCEDURE — 80048 BASIC METABOLIC PNL TOTAL CA: CPT

## 2025-02-16 PROCEDURE — 99284 EMERGENCY DEPT VISIT MOD MDM: CPT

## 2025-02-16 PROCEDURE — A4216 STERILE WATER/SALINE, 10 ML: HCPCS

## 2025-02-16 PROCEDURE — 71045 X-RAY EXAM CHEST 1 VIEW: CPT

## 2025-02-16 PROCEDURE — 85025 COMPLETE CBC W/AUTO DIFF WBC: CPT

## 2025-02-16 PROCEDURE — 94640 AIRWAY INHALATION TREATMENT: CPT

## 2025-02-16 PROCEDURE — 87631 RESP VIRUS 3-5 TARGETS: CPT

## 2025-02-19 ENCOUNTER — TELEPHONE (OUTPATIENT)
Dept: PULMONOLOGY | Facility: HOSPITAL | Age: 75
End: 2025-02-19
Payer: COMMERCIAL

## 2025-02-19 NOTE — TELEPHONE ENCOUNTER
Called and spoke with patient and let him know to start back on the Symbicort. Advised he give us a call back if his symptoms did not improve. Instructed him to call back with any further questions or concerns. Patient was agreeable to treatment plan and acknowledged understanding.     ----- Message from Radha Abbasi sent at 2/19/2025  9:03 AM EST -----  Regarding: RE: Sick  I recommend that he start back on Symbicort and take this 2 puffs BID continuously.  ----- Message -----  From: Lizzy Wilkes MA  Sent: 2/19/2025   8:22 AM EST  To: Radha Abbasi, EUGENIE-CNP  Subject: Sick                                             Patient called in stating he was in the ER on Sunday with an asthma flare up. It had been going on for a week or so- they were treating for a cold. He went to PCP who gave him antibiotics, ER gave him nebulizer and Prednisone 60 mg x5 days. He stopped the Symbicort altogether in December. He is more short of breath and having frequent coughing fits. He is neg for flu, covid, RSV, etc. He is wondering if he needs to start back up on the Symbicort or come see you or Dr. Lebron.

## 2025-04-10 NOTE — PATIENT INSTRUCTIONS
PATIENT INSTRUCTIONS ARE COMPLETE and READY TO PRINT    You have chronic sinusitis with nasal polyposis. Please complete the 14-day course of doxycycline. Then, get a CT sinus scan for surgical planning. Detailed instructions are below.    Doxycycline:  I prescribed a 14-day course of doxycycline. Take it twice a day after breakfast and supper. In the middle of the day, take a daily probiotic or yogurt.  Doxycycline can cause a skin sensitivity reaction with the sun. Avoid sun exposure while taking this medication or apply strong sunscreen (SPF-50 or higher) when in the sun. Please do not consume any dairy, calcium, or iron for 2 hours before or after taking doxycycline because this inactivates doxycycline. Common side effects from doxycycline include stomach upset and diarrhea. Take the antibiotic after meals to avoid these side effects. If you develop abdominal pain, skin rash, or diarrhea, please contact our office at [phone].    CT Sinus:  Please get a CT scan of your sinuses at a  facility after you have completed the doxycycline. Please schedule this CT scan today at the  before leaving today. If that is not possible, contact our scheduling and  service at [phone] to schedule the CT scan.    Xhance:  Please start using Xhance nasal spray. I provided you with a sample. Do 1 puff in each nostril twice a day.  To watch an Xhance demonstration video, please visit [email or web address]  If you decide to do an irrigation, please irrigate first before using Xhance. Otherwise, you will wash Xhance out of your nose with the irrigation.    Most biologics do not cross react with Dupixent. So you could consider a biologic after sinus surgery if needed.    Follow up:  Please follow up with me in 4-6 weeks to discuss your CT sinus scan and revision surgery. If you have questions, feel free to contact my office at [phone] or send us a [place]  message.  _________________________________________________________________________________________    Welcome to Dr. Braeden Thomas’s clinic. We are here to assist you through your ENT care at [facility]. Dr. Braeden Thomas is a Rhinologist who is an expert with advanced fellowship training in Endoscopic Sinus Surgery and Skull Base Surgery.    Dr. Braeden Thomas’s office number is [phone]. Please use this number to contact his care team regardless of which office you use to access care. This number is the most direct way to communicate with all the members of the care team.    Gayathri Tate CNP is a nurse practitioner who is a part of Dr. Thomas’s team. She works collaboratively with Dr. Thomas to meet your goals. This often may include seeing you for more urgent appointments or follow-up visits. She follows the same protocols and guidelines for chronic management of your condition.    Claritza Belle RN is Dr. Thomas’s primary nurse. She can be reached by calling the office as well. Claritza is in clinic Monday through Friday. Non-urgent calls will be returned within 24 hours of the call.    Hannah Govea is Dr. Thomas’s  and she answers the office phone from 9am-4pm Mon-Thurs. On Friday, she answers the phone from 9am-3pm. Call [phone]. She can help you with scheduling of appointments, general questions and information. You may need to leave a message if she is helping another patient. In this case, someone from the team will call you back the same day if you leave your message before 3pm, or the next business morning if after 3pm.    For your convenience, Dr. Thomas sees patients at different [facility] locations including the [facility] and at [facility]. While we try to make your appointments as convenient as possible, occasionally a visit to another location may be necessary to provide the best care for you.    Dr. Thomas makes every effort to run on  time for your appointments. Therefore, if you are more than 15 to 20 minutes late, your appointment may need to be rescheduled to another day. We appreciate your understanding.    We look forward to working with you to meet your healthcare goals.    Scribe Attestation  By signing my name below, MIKE, Neha Iniguez, attest that this documentation has been prepared under the direction and in the presence of Braeden Mancuso MD.

## 2025-04-10 NOTE — PROGRESS NOTES
[facility]  Sinus & Skull Base Surgery    Chief Concern: Chronic sinusitis with nasal polyposis    HPI   Mark Meza is a 74 y.o. male presenting with concerns of sinus and nose problems that began as severe attacks of coughing in approx 2012. The patient describes his sinus/nose symptoms as #1 constant nasal drainage (can be clear or discolored), #2 nasal obstruction, #3 significantly diminished smell and taste. Patient denies facial pain/pressure, periorbital edema, epiphora, and epistaxis. He developed blurry vision, fainting, pneumonia, sprained achilles, and muscle aches when he was on Dupixent for 3 months. This occurred after his nasal polypectomy on 1/3/2022. He currently takes montelukast every night. He has tried nasal saline irrigations. He has never tried Xhance. He has a history of allergic rhinitis or allergies. He endorses significant occupational exposure to metal. He worked in a bronze factory, machine shop, and foundry. He also believes he was exposed to asbestos when he worked at a different job. He received a course of antibiotics in early 2025 for suspected pneumonia.    Nasal polypectomy on 1/3/2022.  Allergies: Ciprofloxacin, Dupilumab, Metronidazole, and Oxycodone    Past Medical History  High blood pressure  Asthma  Chronic rhinosinusitis    Surgical History  Nasal polypectomy on 1/3/2022  Knee replacement  Colostomy for diverticulitis and perforated bowel.    Social History  He reports that he has never smoked. He has never used smokeless tobacco. No history on file for alcohol use and drug use. Significant exposure to occupational secondhand smoke and mother was a smoker.    Family History  Mother had colon cancer    Allergies  Ciprofloxacin, Dupilumab, Metronidazole, and Oxycodone    Medications  Current Outpatient Medications:     albuterol 2.5 mg /3 mL (0.083 %) nebulizer solution, Inhale., Disp: , Rfl:     budesonide-formoterol (Symbicort) 80-4.5 mcg/actuation inhaler,  Inhale 2 puffs 2 times a day. Rinse mouth with water after use to reduce aftertaste and incidence of candidiasis. Do not swallow., Disp: , Rfl:     calcium carbonate 600 mg calcium (1,500 mg) tablet, once every 24 hours., Disp: , Rfl:     cholecalciferol (Vitamin D-3) 125 MCG (5000 UT) capsule, once every 24 hours., Disp: , Rfl:     fluticasone (Flonase) 50 mcg/actuation nasal spray, Administer 2 sprays into each nostril once daily., Disp: , Rfl:     losartan (Cozaar) 50 mg tablet, Take 0.5 tablets (25 mg) by mouth once daily., Disp: , Rfl:     montelukast (Singulair) 10 mg tablet, once every 24 hours., Disp: , Rfl:     mv-min-FA-vit K-lutein-zeaxant 200 mcg-15 mcg- 5 mg-1 mg capsule, once every 24 hours., Disp: , Rfl:     albuterol 90 mcg/actuation inhaler, Inhale. (Patient not taking: Reported on 4/11/2025), Disp: , Rfl:     azelastine (Astelin) 137 mcg (0.1 %) nasal spray, USE 1 SPRAY INTRANASALLY TWICE DAILY (Patient not taking: Reported on 4/11/2025), Disp: 30 mL, Rfl: 0    budesonide-formoteroL (Symbicort) 160-4.5 mcg/actuation inhaler, Inhale 2 puffs 2 times a day. Rinse mouth with water after use to reduce aftertaste and incidence of candidiasis. Do not swallow., Disp: 1 each, Rfl: 11    Review of Systems   Negative for constitutional, eyes, cardiac, pulmonary, hepatic, renal, digestive, hematologic, epileptic, syncopal, musculo-skeletal, mental health, integumentary, hypertensive, lipid, arthritic, diabetic, thyroid or neurologic disorders (except as listed in the HPI, PMH and Problem List).    Assessment   Mark Meza is a 74 y.o. male with symptoms of constant nasal drainage, nasal obstruction, significant loss of smell and taste. 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   4/11/25 - CRS with NP. Rx doxy x 14 days. CT sinus ordered. Did discuss likelihood of revision surgery. Pt cannot take oral or IV steroids due to previous bowel perforation    Plan    I discussed the findings with the patient and offered reassurance and counseling. We agreed to proceed with the following therapeutic measures to address the issues noted above.    I personally reviewed the note from NP Radha Abbasi from 7/12/2024. This is contributing to my history, assessment, and plan: Patient was diagnosed with asthma, nasal polyps, and a dust mite allergy. RAST allergy testing was mildly positive to dust mites. Patient was put on Singulair, Flonase, and Xyzal. He underwent a nasal polypectomy and was on Dupixent for 3 months.    Nasal endoscopy. Findings: as noted.  Patient was prescribed a 14-day course of doxycycline 100 mg BID. Patient was advised to take the antibiotic after meals, avoid sun exposure or apply strong sunscreen (SPF-50 or higher) when in the sun, avoid dairy/calcium/iron within 2 hours of taking the antibiotic, and to consume a daily yogurt or a probiotic in the middle of the day.   Patient was instructed to obtain a CT scan of the sinuses without contrast after finishing the course of antibiotics.  Given Xhance to use as a trial - sample provided  Follow up with me in 4-6 weeks.    I spent greater than 60 total minutes in the patient's care, of which at least 50% were spent on patient counseling, description of the diagnosis and findings, treatment planning and answering patient questions related to the disease process.    Braeden Mancuso MD [facility]  Division of Rhinology, Sinus, and Skull Base Surgery       Exam   General: This is a healthy appearing male who appears his stated age. The patient is alert and appropriately verbally conversant without hoarseness.    Face: The face was inspected and no cutaneous masses or lesions were visualized. There was no erythema or edema noted. Facial movement was symmetric without weakness. No skin lesions were detected. There was no sinus tenderness elicited. The parotid and submandibular glands were normal to palpation.    Eyes:  Extra-ocular muscle function was intact. No nystagmus was observed. Pupils were equal.    Cranial Nerves: Cranial nerves II, III, IV, and VI were noted to be intact via extra-ocular muscle movement testing. Cranial nerve VII noted to be intact and symmetric by facial movement. Cranial nerve VIII was tested with whispered voice examination and revealed symmetric hearing. Cranial nerves IX and X noted to be intact by gag reflex and palatal movement. Cranial nerve XII noted to be intact by active and symmetric tongue movement.    Nose: Examination of the nose revealed the nasal dorsum to be midline. Intranasal exam reveals the septum was obscured by polyps. The inferior turbinates were hypertrophic and edematous. No masses, polyps, mucopus, or other lesion on anterior rhinoscopy. See below procedure note as applicable for further exam.    Oral Cavity: Examination of the oral cavity revealed no mass lesions nor infection. The palate was noted to be intact without evidence of clefting. The tongue exhibited normal mobility. Mucosa was moist without lesion. The lips were free of lesion. Gums were free of inflammation. Dentition: normal without obvious infection or inflammation.    Oropharynx: The oral pharynx was free of mass lesion or mucosal abnormality. The palate was noted to be without lesion. The uvula was normal appearing. The tonsils were surgically absent.    Ears: Examination of the ears revealed that the auricles were normally formed with no lesions. The external auditory canals were cleaned of any obstructing cerumen. The tympanic membranes were intact and freely mobile to pneumatoscopy. There are no significant retraction pockets. There is no inflammation visualized. No effusions are seen.    Neck: Visualization and palpation of the neck revealed no mass lesions, no thyromegaly or thyroid masses. No skin lesions or inflammatory processes were detected. The cervical musculature was normal to  palpation.    Cervical Lymphatics: There were no palpable lymph nodes in the posterior triangle, submandibular triangle, jugulodigastric region, or central neck.    CV: peripheral perfusion intact, no cyanosis, clubbing or edema of extremities.    Respiratory: Normal inspiration and expiration and chest wall expansion, no use of accessory muscles to breathe, no stridor or stertor.    Procedure Note:  Procedure: Nasal endoscopy - diagnostic  Indication: concern for chronic sinusitis  Informed consent obtained: risks, benefits, alternatives, and expectations discussed with patient and the patient wishes to proceed.    Findings: After topical decongestion with decongestant and anesthetic spray, nasal endoscopy was performed using an endoscope. The septum was obscured by polyps. Nasal cavity is subtotally obstructed with polyps. The inferior turbinates were hypertrophic and edematous. The middle turbinates appeared largely polypoid. The middle meatus is with masses consistent with polyps. The superior meatus and sphenoethmoid recess are filled with polyps bilaterally. Pushing pas there does appear to be evidence of surgical dissection in the maxillary and ethmoid sinuses which are obstructed with polyps. The nasal passageway is patent. The nasopharynx was clear. There were no complications and the patient tolerated the procedure well.       Scribe Attestation  By signing my name below, I, Neha Iniguez, attest that this documentation has been prepared under the direction and in the presence of Braeden Mancuso MD.

## 2025-04-11 ENCOUNTER — APPOINTMENT (OUTPATIENT)
Dept: OTOLARYNGOLOGY | Facility: CLINIC | Age: 75
End: 2025-04-11
Payer: COMMERCIAL

## 2025-04-11 VITALS — HEIGHT: 68 IN | WEIGHT: 200 LBS | BODY MASS INDEX: 30.31 KG/M2

## 2025-04-11 DIAGNOSIS — J34.89 NASAL DRAINAGE: ICD-10-CM

## 2025-04-11 DIAGNOSIS — J33.8 POLYP OF NASAL SINUS: ICD-10-CM

## 2025-04-11 DIAGNOSIS — J34.89 LESION OR MASS OF PARANASAL SINUSES: ICD-10-CM

## 2025-04-11 DIAGNOSIS — J34.3 HYPERTROPHY OF INFERIOR NASAL TURBINATE: ICD-10-CM

## 2025-04-11 DIAGNOSIS — J32.4 CHRONIC PANSINUSITIS: ICD-10-CM

## 2025-04-11 DIAGNOSIS — J32.8 OTHER CHRONIC SINUSITIS: ICD-10-CM

## 2025-04-11 DIAGNOSIS — J32.4 CHRONIC PANSINUSITIS: Primary | ICD-10-CM

## 2025-04-11 PROCEDURE — 1159F MED LIST DOCD IN RCRD: CPT | Performed by: OTOLARYNGOLOGY

## 2025-04-11 PROCEDURE — 1160F RVW MEDS BY RX/DR IN RCRD: CPT | Performed by: OTOLARYNGOLOGY

## 2025-04-11 PROCEDURE — 3008F BODY MASS INDEX DOCD: CPT | Performed by: OTOLARYNGOLOGY

## 2025-04-11 PROCEDURE — 31231 NASAL ENDOSCOPY DX: CPT | Performed by: OTOLARYNGOLOGY

## 2025-04-11 PROCEDURE — 1036F TOBACCO NON-USER: CPT | Performed by: OTOLARYNGOLOGY

## 2025-04-11 PROCEDURE — 99205 OFFICE O/P NEW HI 60 MIN: CPT | Performed by: OTOLARYNGOLOGY

## 2025-04-11 RX ORDER — DOXYCYCLINE 100 MG/1
100 CAPSULE ORAL 2 TIMES DAILY
Qty: 28 CAPSULE | Refills: 0 | Status: SHIPPED | OUTPATIENT
Start: 2025-04-11 | End: 2025-04-25

## 2025-04-11 RX ORDER — BUDESONIDE AND FORMOTEROL FUMARATE DIHYDRATE 80; 4.5 UG/1; UG/1
2 AEROSOL RESPIRATORY (INHALATION)
COMMUNITY

## 2025-04-11 RX ORDER — FLUTICASONE PROPIONATE 93 UG/1
SPRAY, METERED NASAL
Qty: 16 ML | Refills: 11 | Status: SHIPPED | OUTPATIENT
Start: 2025-04-11

## 2025-04-11 ASSESSMENT — PATIENT HEALTH QUESTIONNAIRE - PHQ9
2. FEELING DOWN, DEPRESSED OR HOPELESS: NOT AT ALL
1. LITTLE INTEREST OR PLEASURE IN DOING THINGS: NOT AT ALL
SUM OF ALL RESPONSES TO PHQ9 QUESTIONS 1 AND 2: 0

## 2025-04-30 ENCOUNTER — HOSPITAL ENCOUNTER (OUTPATIENT)
Dept: RADIOLOGY | Facility: CLINIC | Age: 75
Discharge: HOME | End: 2025-04-30
Payer: MEDICARE

## 2025-04-30 DIAGNOSIS — J32.8 OTHER CHRONIC SINUSITIS: ICD-10-CM

## 2025-04-30 PROCEDURE — 70486 CT MAXILLOFACIAL W/O DYE: CPT

## 2025-05-01 ENCOUNTER — TELEMEDICINE (OUTPATIENT)
Dept: OTOLARYNGOLOGY | Facility: CLINIC | Age: 75
End: 2025-05-01
Payer: MEDICARE

## 2025-05-01 DIAGNOSIS — J34.89 NASAL DRAINAGE: ICD-10-CM

## 2025-05-01 DIAGNOSIS — J32.4 CHRONIC PANSINUSITIS: Primary | ICD-10-CM

## 2025-05-01 DIAGNOSIS — J34.3 HYPERTROPHY OF INFERIOR NASAL TURBINATE: ICD-10-CM

## 2025-05-01 DIAGNOSIS — J33.8 POLYP OF NASAL SINUS: ICD-10-CM

## 2025-05-01 DIAGNOSIS — J32.4 CHRONIC PANSINUSITIS: ICD-10-CM

## 2025-05-01 DIAGNOSIS — J34.3 HYPERTROPHY OF NASAL TURBINATES: ICD-10-CM

## 2025-05-01 PROCEDURE — 1036F TOBACCO NON-USER: CPT | Performed by: OTOLARYNGOLOGY

## 2025-05-01 PROCEDURE — 1160F RVW MEDS BY RX/DR IN RCRD: CPT | Performed by: OTOLARYNGOLOGY

## 2025-05-01 PROCEDURE — 1159F MED LIST DOCD IN RCRD: CPT | Performed by: OTOLARYNGOLOGY

## 2025-05-01 PROCEDURE — 99214 OFFICE O/P EST MOD 30 MIN: CPT | Performed by: OTOLARYNGOLOGY

## 2025-05-01 RX ORDER — BUDESONIDE 1 MG/2ML
INHALANT ORAL
COMMUNITY
Start: 2024-09-29

## 2025-05-01 RX ORDER — VIT A/VIT C/VIT E/ZINC/COPPER 2148-113
TABLET ORAL
COMMUNITY

## 2025-05-01 RX ORDER — PREDNISOLONE ACETATE 10 MG/ML
SUSPENSION/ DROPS OPHTHALMIC
COMMUNITY
Start: 2025-03-18

## 2025-05-01 ASSESSMENT — PATIENT HEALTH QUESTIONNAIRE - PHQ9
SUM OF ALL RESPONSES TO PHQ9 QUESTIONS 1 & 2: 0
1. LITTLE INTEREST OR PLEASURE IN DOING THINGS: NOT AT ALL
2. FEELING DOWN, DEPRESSED OR HOPELESS: NOT AT ALL

## 2025-05-01 NOTE — PROGRESS NOTES
Sinus & Skull Base Surgery    Telephone Consent:  While technically available, the patient was unable or unwilling to consent to connect via audio/video telehealth technology; therefore, I performed this visit using a real-time audio only connection between Mark Bowen Meza & Braeden Mancuso MD. Verbal consent was requested and obtained from Mark Meza on this date, 05/01/25 for a telehealth visit and the patient's location was confirmed at the time of the visit.    HPI   5/1/25 -TV- Patient presents for a followup visit. He reports feeling better after doxycycline and Xhance but his symptoms persist. Approx 4-5 days after starting these, he noticed improvement in his taste and eventually in his smell also, but his drainage continues and he is interested in surgical options. His recent CT sinus results were also discussed today. He is interested in surgical options. He states that his other ENT had him on budesonide irrigations for approx 6 months. He will be on a camping trip from May 15-19.    Per initial note 4/11/2025:  Mark Meza is a 74 y.o. male presenting with concerns of sinus and nose problems that began as severe attacks of coughing in approx 2012. The patient describes his sinus/nose symptoms as #1 constant nasal drainage (can be clear or discolored), #2 nasal obstruction, #3 significantly diminished smell and taste. Patient denies facial pain/pressure, periorbital edema, epiphora, and epistaxis. He developed blurry vision, fainting, pneumonia, sprained achilles, and muscle aches when he was on Dupixent for 3 months. This occurred after his nasal polypectomy on 1/3/2022. He currently takes montelukast every night. He has tried nasal saline irrigations. He has never tried Xhance. He has a history of allergic rhinitis or allergies. He endorses significant occupational exposure to metal. He worked in a bronze factory, machine shop, and foundry. He also believes he was  exposed to asbestos when he worked at a different job. He received a course of antibiotics in early 2025 for suspected pneumonia.    Nasal polypectomy on 1/3/2022.  Allergies: Ciprofloxacin, Dupilumab, Metronidazole, and Oxycodone    Assessment   Mark Meza is a 74 y.o. male h/o asthma with symptoms of constant nasal drainage, nasal obstruction, significant loss of smell and taste. 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 He has a history of nasal polypectomy on 1/3/2022.  4/11/25 - CRS with NP. Rx doxy x 14 days. CT sinus ordered. Did discuss likelihood of revision surgery. Pt cannot take oral or IV steroids due to previous bowel perforation.  5/1/25 -TV- He has failed maximal medical therapy including greater than 3 months of topical steroid sprays and greater than 2 weeks of antibiotics. He is a surgical candidate as noted below and provided consent to proceed.    Plan   I discussed the findings with the patient and offered reassurance and counseling. We agreed to proceed with the following therapeutic measures to address the issues noted above.    I personally reviewed the patient's CT scan images and results. I discussed the results personally with the patient. The following findings were discussed: CT Sinus: 4/30/2025: Shows bilateral pansinus thickening most prom in the ethmoids with inferior turbinate hypertrophy bilaterally. Evidence of prior partial sinus surgeries bilaterally.    I previously reviewed the note from SOPHIE Abbasi from 7/12/2024. This is contributing to my history, assessment, and plan: Patient was diagnosed with asthma, nasal polyps, and a dust mite allergy. RAST allergy testing was mildly positive to dust mites. Patient was put on Singulair, Flonase, and Xyzal. He underwent a nasal polypectomy and was on Dupixent for 3 months.    Audio only visit.  Continue Xhance until surgery.  Patient is a good candidate for endoscopic  sinus surgery (ESS). Patient has failed maximal medical therapy. Mark Meza and I discussed his symptoms and clinical findings noted above in detail. We discussed options including observation, continued medical therapy, or consideration of surgical intervention. Endoscopic sinus surgery itself was discussed at length. The risks, benefits, complications, and alternatives were explained in detail including but not limited to persistence of symptoms, anesthesia, pain, changes in or loss of smell, nasal obstruction, septal perforation, bleeding, infection, need for nasal packing, double vision, vision loss, CSF leak requiring other procedures to repair, numbness of teeth/and or face, need for revision surgery, injury to surrounding tissue, and unexpected risks.  Patient is a candidate for bilateral FESS (total), bilateral inferior turbinate submucous resections, IGS.  The patient expressed understanding of these risks and provided informed consent. He was apprised on the importance of the aftercare following surgery, especially the need to perform frequent nasal irrigations. An information sheet via the medical record was provided to the patient, which included the rationale for surgery, risks, and expected postoperative care. Patient will be contacted by surgical schedulers.    Patient will follow up with me postoperatively.    I spent greater than 30 total minutes in the patient's care, of which at least 50% were spent on patient counseling, description of the diagnosis and findings, treatment planning and answering patient questions related to the disease process.      Braeden Mancuso MD [facility]  Division of Rhinology, Sinus, and Skull Base Surgery       Exam   Audio only visit  Previous scope exam demonstrated polyps bilaterally     Scribe Attestation  By signing my name below, I, Neha Iniguez, attest that this documentation has been prepared under the direction and in the presence of Braeden RAMOS  MD Bartolome.

## 2025-05-01 NOTE — PATIENT INSTRUCTIONS
You are a good candidate for endoscopic sinus surgery. After your sinus surgery, you will need to do frequent saline irrigations. This is extremely important so that your sinuses stay open after surgery. If you test positive for COVID 4 weeks before surgery or sooner, your surgery would need to be rescheduled. Please review the instructions below to prepare for surgery.    Xhance:  Please continue Xhance nasal spray until surgery. Do 1 puff in each nostril twice a day.  To watch an Xhance demonstration video, please visit [email or web address]  If you decide to do an irrigation, please irrigate first before using Xhance. Otherwise, you will wash Xhance out of your nose with the irrigation.    PATIENT INFORMATION/INSTRUCTIONS PRIOR TO SINUS SURGERY: *Please Read Carefully*    Dr. Mancuso discussed the rationale for endoscopic sinus surgery, along with the benefits, risks, potential complications, and side effects of the procedure with you today. If you have any questions about these subjects, please feel free to call our office at [phone].    You can expect after surgery to have increased symptoms and congestion for at least 1 week and sometimes up to 1 month. Everyone's body reacts differently. You will have at least 2 visits with Dr. Mancuso for cleaning/debridement of the sinuses in the office after surgery, approximately 1 week and 1 month after surgery. This is necessary to ensure the sinuses heal well. Dr. Mancuso works closely with his nurse practitioner, Gayathri Tate. You will follow up with Dr. Mancuso for the first 3 months after your surgery. After this, Dr. Mancuso and Gayathri will work as a team to take care of you in the months or years after.    As described, your sinus surgery is only half the jordan. Post operatively, it is VITAL that you continue the nasal irrigations and other medications directed by Dr. Mancuso. You will be given an instructional sheet post operatively that describes the expected  disease course including nasal care. Dr. Mancuso will want you to start irrigations of the nose with saline post operatively. This is also important to keep the nose and sinuses open.    STOP TAKING THESE MEDICATIONS prior to surgery:  Aspirin - 10 to 14 days before surgery  Plavix/clopidogrel - 10 days before surgery  NSAIDs - 7 days before surgery (NSAIDs include painkillers like Motrin, Aleve, Naprosyn, Mobic, diclofenac, and ibuprofen)  Vitamin E - 10 to 14 days before surgery  Multivitamins with Vitamin E - 10 to 14 days before surgery  Herbal Medications/Diet Pills - 10 to 14 days before surgery    5.   Avoid NSAIDs for PAIN RELIEF. If needed, you could take Tylenol on the day of surgery with sips of water. You may also use opiates prescribed by your physician which includes medications like Percocet / Vicodin / MS Contin / Darvocet / Ultram.    6.   BLOOD THINNERS including Coumadin, Plavix, and Ticlid are to be stopped as directed by surgeon/cardiologist or as listed below.    7.   FOR PATIENTS WITH ASTHMA/COPD:  Use your inhalers as prescribed/as needed on the day of surgery. Bring your inhalers with you to the hospital. If you have Obstructive Sleep Apnea and are on a CPAP/BiPAP machine, please bring it with you to the hospital.    8.   On the day of surgery, DO NOT wear jewelry, body piercings, makeup, nail polish, hairpins, or contacts. Leave all valuables and money with family members. If you have dentures, please leave these with family members.    9.   IF you are undergoing an OUTPATIENT procedure (Ambulatory Surgery - going home on the same day), you must have someone drive you home and stay with you for 24 hours after surgery. You cannot stay alone in a hotel room after outpatient surgery. Also, a  or  cannot be made a responsible caregiver. Your surgery may be cancelled if you do not have someone take care of you for 24 hours.    10.  You will be given a time to arrive the  business day before surgery. If you do not hear about a time by 4:30 pm the business day before surgery, please call [phone] and ask for a time.    AFTER SURGERY, please observe the following precautions for 2 weeks from the date of surgery:  Please refrain from blowing your nose. Do not stifle any sneezes. If you must sneeze, try to sneeze through an open mouth. Please do not stick anything in your nose other than any medicine or irrigations we recommend. Please do not insert a Q-tip or finger in the nose because this will cause further trauma. Please refrain from any activities that will increase your heart rate or blood pressure. Please do not exercise and avoid all strenuous activities. Please refrain from lifting objects greater than 10 pounds. Try to keep your head above your heart as much as possible. You will also need to avoid all traveling outside your local area for 2 weeks from the date of surgery. After this 2-week period, GRADUALLY ease back into normal activities.    Saline irrigation instructions for after sinus surgery:  After surgery, you will need to do frequent saline irrigations. This is extremely important so that your sinuses stay open and heal well after surgery.  To prepare a saline irrigation: Add 1 salt/baking soda packet to 8 ounces of distilled water, or use the saline recipe provided below. Shake to dissolve. Use half of the solution (4 ounces or 120 mL) in each nostril.  IMPORTANT - the following only applies if we prescribe a medicine to put in your irrigation solution:  If you decide to microwave or otherwise heat up your nasal irrigation solution, be sure to only heat up the saline solution. Be sure to add any prescribed medication (such as budesonide) AFTER you have warmed the solution. Otherwise the heating process can deactivate the medicine. Follow all other instructions for the medicated saline irrigation. Heating up the irrigation solution is entirely optional if it  improves your comfort.  If you are doing an irrigation and using a nasal spray, please irrigate first before using the nasal spray. Otherwise, you will wash the nasal spray out of your nose with the irrigation.    Saline recipe:  Use distilled water. Prepare 8 ounces (or 240 mL) of distilled water with 1/2 teaspoon of baking soda and 1/2 teaspoon of table salt. Shake bottle to dissolve. Avoid using tap water. If you must use tap water, be sure to boil it, then let it cool down.    Most biologics do not cross react with Dupixent. If needed, you could consider a biologic (other than Dupixent) after sinus surgery. Biologics may work better after sinus surgery.    Scribe Attestation  By signing my name below, I, Neha Iniguez, attest that this documentation has been prepared under the direction and in the presence of Braeden Mancuso MD.

## 2025-05-11 NOTE — CPM/PAT H&P
[facility]/PAT Evaluation     Name: Mark Meza (Mark Meza)  /Age: 1950/75 y.o.     In-Person       Chief Complaint: 76 y/o Male scheduled for Bilateral endoscopic sinus surgery on  with Dr. Mancuso. 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 He has a history of nasal polypectomy on 1/3/2022. He is a surgical candidate and presents to [facility] today for perioperative risk stratification and optimization.    Medical History[1]   Bronchial asthma  Allergic rhinitis  Asbestos exposure   Bowel perforation  Surgical History[2]    Allergies[3]  Oxycodone  Ciprofloxacin  Metronidazole  Dupilumab    Prior to Admission medications    Medication Sig Start Date End Date Taking? Authorizing Provider   albuterol 2.5 mg /3 mL (0.083 %) nebulizer solution Inhale. 21   Historical Provider, MD   albuterol 90 mcg/actuation inhaler Inhale.  Patient not taking: Reported on 2025   Historical Provider, MD   azelastine (Astelin) 137 mcg (0.1 %) nasal spray USE 1 SPRAY INTRANASALLY TWICE DAILY  Patient not taking: Reported on 2025   Woody Lebron MD   budesonide (Pulmicort) 1 mg/2 mL nebulizer solution INHALE CONTENTS OF 1 VIAL VIA NEBULIZER TWICE DAILY 24   Historical Provider, MD   budesonide-formoteroL (Symbicort) 160-4.5 mcg/actuation inhaler Inhale 2 puffs 2 times a day. Rinse mouth with water after use to reduce aftertaste and incidence of candidiasis. Do not swallow. 24  EUGENIE Mccall-CNP   budesonide-formoterol (Symbicort) 80-4.5 mcg/actuation inhaler Inhale 2 puffs 2 times a day. Rinse mouth with water after use to reduce aftertaste and incidence of candidiasis. Do not swallow.    Historical Provider, MD   calcium carbonate 600 mg calcium (1,500 mg) tablet once every 24 hours.    Historical Provider, MD   cholecalciferol (Vitamin D-3) 125 MCG (5000 UT) capsule once  every 24 hours.    Historical Provider, MD   fluticasone (Flonase) 50 mcg/actuation nasal spray Administer 2 sprays into each nostril once daily.    Historical Provider, MD   fluticasone propionate (Xhance) 93 mcg/actuation aerosol breath activated Spray 1 spray in each nostril twice daily. 4/11/25   Braeden Mancuso MD   losartan (Cozaar) 50 mg tablet Take 0.5 tablets (25 mg) by mouth once daily.    Historical Provider, MD   montelukast (Singulair) 10 mg tablet once every 24 hours. 6/11/21   Historical Provider, MD   mv-min-FA-vit K-lutein-zeaxant 200 mcg-15 mcg- 5 mg-1 mg capsule once every 24 hours.    Historical Provider, MD   prednisoLONE acetate (Pred-Forte) 1 % ophthalmic suspension INSTILL 1 DROP 3 TIMES A DAY IN RIGHT EYE FOR 5 DAYS THEN STOP 3/18/25   Historical Provider, MD   vitamins A,C,E-zinc-copper (PreserVision AREDS) 2,148 mcg-113 mg-45 mg-17.4mg tablet Take by mouth.    Historical Provider, MD        PAT ROS:   Constitutional:   neg    Neuro/Psych:   neg    Eyes:   neg    Ears:   Nose:    nasal discharge   sinus congestion  Mouth:   neg    Throat:   neg    Neck:   neg    Cardio:   neg    Respiratory:   neg    Endocrine:   neg    GI:   neg    :   neg    Musculoskeletal:   neg    Hematologic:   neg    Skin:  neg        PAT Physical Exam   General: Laying in bed. NAD.   Eyes: EOMI  Head/neck: NCAT  Cardiac: regular rate in chart  Pulm: normal respiratory effort  GI: soft, NT/ND, no masses palpated  Msk: DAVID  Extremities: normal extremities  Skin: warm, dry, no lesions noted  Neuro: AOx3  Psych: appropriate mood and behavior    PAT AIRWAY:   Airway:     Mallampati::  II    TM distance::  >3 FB    Neck ROM::  Full    Visit Vitals  Smoking Status Never     Vitals:    05/12/25 0852   BP: 148/84   Pulse: 66   Temp: 37.1 °C (98.7 °F)   SpO2: 96%       Assessment and Plan:  74 y/o Male scheduled for Bilateral endoscopic sinus surgery on 5/20 with Dr. Mancuso. 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 He has a history of nasal polypectomy on 1/3/2022. He is a surgical candidate and presents to [facility] today for perioperative risk stratification and optimization.    Neuro:  No neurologic diagnosis or significant findings on chart review, clinical presentation and evaluation.  No grossly apparent neurologic perioperative risk.  age  Patient is not at increased risk for perioperative CVA    HEENT:  Chronic rhinosinusitis (CRS) with nasal polyposis (NP) - Bilateral endoscopic sinus surgery on 5/20 with Dr. Mancuso.   He has a history of nasal polypectomy on 1/3/2022. Patient cannot take oral or IV steroids due to previous bowel perforation. He has failed maximal medical therapy including greater than 3 months of topical steroid sprays and greater than 2 weeks of antibiotics.     Cardiovascular:  HTN, Losartan (hold 24hrs prior)  METS: 9  RCRI: 0 points, 3.9%  risk for postoperative MACE   TERRY: .02% risk for 30 day postoperative MACE    Pulmonary:  Bronchial asthma, Allergic rhinitis, Asbestos exposure,  Albuterol, azelastine, budesonide, budesonide-formoterol   age > 60, COPD  Stop Bang score is 5 placing patient at moderate risk for SUKHJINDER  ARISCAT: 26-44 points, 13.3% risk of in-hospital postoperative pulmonary complication  PRODIGY: Moderate risk for opioid induced respiratory depression  Smoking cessation discussed with patient, patient also provided cessation education/hotline handout, [facility] discussed, patient also provided deep breathing exercises and incentive spirometry educational handout    Renal:   No renal diagnosis or significant findings on chart review, clinical presentation or evaluation. No further preoperative testing/intervention is indicated at this time.  Pt at Low risk for perioperative MEAGAN based on Dynamic Predictive Scoring Tool for Perioperative MEAGAN     Endocrine:  No endocrine diagnosis or  significant findings on chart review or clinical presentation and evaluation. No further testing or intervention is indicated at this time.    Hematologic:  No hematologic diagnosis, however patient is at an increased risk for DVT  Caprini Score 4, patient at Low risk for perioperative DVT.  Patient provided with VTE education/handout.    Gastrointestinal:   No GI diagnosis or significant findings on chart review or clinical presentation and evaluation.   Eat-10 score 0  Apfel 1    Infectious disease:   No infectious diagnosis or significant findings on chart review or clinical presentation and evaluation.   Prescription provided for CHG body wash and dental rinse. CHG use instructions reviewed and provided to patient.  Staph screen collected    Musculoskeletal:   No diagnosis or significant findings on chart review or clinical presentation and evaluation.     Anesthesia/Airway:  Sensitive to pain medications such as oxycodone, gets hypotensive.     Medication instructions and NPO guidelines reviewed with the patient.  All questions or concerns discussed and addressed.      Patient seen and staffed with Dr. Ennis.    Ari Gallegos DO  Anesthesiology PGY3       [1] No past medical history on file.  [2] No past surgical history on file.  [3]   Allergies  Allergen Reactions    Ciprofloxacin Unknown    Dupilumab Other    Metronidazole Unknown    Oxycodone Unknown

## 2025-05-11 NOTE — H&P (VIEW-ONLY)
[facility]/PAT Evaluation     Name: Mark Meza (Mark Meza)  /Age: 1950/75 y.o.     In-Person       Chief Complaint: 74 y/o Male scheduled for Bilateral endoscopic sinus surgery on  with Dr. Mancuso. 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 He has a history of nasal polypectomy on 1/3/2022. He is a surgical candidate and presents to [facility] today for perioperative risk stratification and optimization.    Medical History[1]   Bronchial asthma  Allergic rhinitis  Asbestos exposure   Bowel perforation  Surgical History[2]    Allergies[3]  Oxycodone  Ciprofloxacin  Metronidazole  Dupilumab    Prior to Admission medications    Medication Sig Start Date End Date Taking? Authorizing Provider   albuterol 2.5 mg /3 mL (0.083 %) nebulizer solution Inhale. 21   Historical Provider, MD   albuterol 90 mcg/actuation inhaler Inhale.  Patient not taking: Reported on 2025   Historical Provider, MD   azelastine (Astelin) 137 mcg (0.1 %) nasal spray USE 1 SPRAY INTRANASALLY TWICE DAILY  Patient not taking: Reported on 2025   Woody Lebron MD   budesonide (Pulmicort) 1 mg/2 mL nebulizer solution INHALE CONTENTS OF 1 VIAL VIA NEBULIZER TWICE DAILY 24   Historical Provider, MD   budesonide-formoteroL (Symbicort) 160-4.5 mcg/actuation inhaler Inhale 2 puffs 2 times a day. Rinse mouth with water after use to reduce aftertaste and incidence of candidiasis. Do not swallow. 24  EUGENIE Mccall-CNP   budesonide-formoterol (Symbicort) 80-4.5 mcg/actuation inhaler Inhale 2 puffs 2 times a day. Rinse mouth with water after use to reduce aftertaste and incidence of candidiasis. Do not swallow.    Historical Provider, MD   calcium carbonate 600 mg calcium (1,500 mg) tablet once every 24 hours.    Historical Provider, MD   cholecalciferol (Vitamin D-3) 125 MCG (5000 UT) capsule once  every 24 hours.    Historical Provider, MD   fluticasone (Flonase) 50 mcg/actuation nasal spray Administer 2 sprays into each nostril once daily.    Historical Provider, MD   fluticasone propionate (Xhance) 93 mcg/actuation aerosol breath activated Spray 1 spray in each nostril twice daily. 4/11/25   Braeden Mancuso MD   losartan (Cozaar) 50 mg tablet Take 0.5 tablets (25 mg) by mouth once daily.    Historical Provider, MD   montelukast (Singulair) 10 mg tablet once every 24 hours. 6/11/21   Historical Provider, MD   mv-min-FA-vit K-lutein-zeaxant 200 mcg-15 mcg- 5 mg-1 mg capsule once every 24 hours.    Historical Provider, MD   prednisoLONE acetate (Pred-Forte) 1 % ophthalmic suspension INSTILL 1 DROP 3 TIMES A DAY IN RIGHT EYE FOR 5 DAYS THEN STOP 3/18/25   Historical Provider, MD   vitamins A,C,E-zinc-copper (PreserVision AREDS) 2,148 mcg-113 mg-45 mg-17.4mg tablet Take by mouth.    Historical Provider, MD        PAT ROS:   Constitutional:   neg    Neuro/Psych:   neg    Eyes:   neg    Ears:   Nose:    nasal discharge   sinus congestion  Mouth:   neg    Throat:   neg    Neck:   neg    Cardio:   neg    Respiratory:   neg    Endocrine:   neg    GI:   neg    :   neg    Musculoskeletal:   neg    Hematologic:   neg    Skin:  neg        PAT Physical Exam   General: Laying in bed. NAD.   Eyes: EOMI  Head/neck: NCAT  Cardiac: regular rate in chart  Pulm: normal respiratory effort  GI: soft, NT/ND, no masses palpated  Msk: DAVID  Extremities: normal extremities  Skin: warm, dry, no lesions noted  Neuro: AOx3  Psych: appropriate mood and behavior    PAT AIRWAY:   Airway:     Mallampati::  II    TM distance::  >3 FB    Neck ROM::  Full    Visit Vitals  Smoking Status Never     Vitals:    05/12/25 0852   BP: 148/84   Pulse: 66   Temp: 37.1 °C (98.7 °F)   SpO2: 96%       Assessment and Plan:  76 y/o Male scheduled for Bilateral endoscopic sinus surgery on 5/20 with Dr. Mancuso. 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 He has a history of nasal polypectomy on 1/3/2022. He is a surgical candidate and presents to [facility] today for perioperative risk stratification and optimization.    Neuro:  No neurologic diagnosis or significant findings on chart review, clinical presentation and evaluation.  No grossly apparent neurologic perioperative risk.  age  Patient is not at increased risk for perioperative CVA    HEENT:  Chronic rhinosinusitis (CRS) with nasal polyposis (NP) - Bilateral endoscopic sinus surgery on 5/20 with Dr. Mancuso.   He has a history of nasal polypectomy on 1/3/2022. Patient cannot take oral or IV steroids due to previous bowel perforation. He has failed maximal medical therapy including greater than 3 months of topical steroid sprays and greater than 2 weeks of antibiotics.     Cardiovascular:  HTN, Losartan (hold 24hrs prior)  METS: 9  RCRI: 0 points, 3.9%  risk for postoperative MACE   TERRY: .02% risk for 30 day postoperative MACE    Pulmonary:  Bronchial asthma, Allergic rhinitis, Asbestos exposure,  Albuterol, azelastine, budesonide, budesonide-formoterol   age > 60, COPD  Stop Bang score is 5 placing patient at moderate risk for SUKHJINDER  ARISCAT: 26-44 points, 13.3% risk of in-hospital postoperative pulmonary complication  PRODIGY: Moderate risk for opioid induced respiratory depression  Smoking cessation discussed with patient, patient also provided cessation education/hotline handout, [facility] toilet education discussed, patient also provided deep breathing exercises and incentive spirometry educational handout    Renal:   No renal diagnosis or significant findings on chart review, clinical presentation or evaluation. No further preoperative testing/intervention is indicated at this time.  Pt at Low risk for perioperative MEAGAN based on Dynamic Predictive Scoring Tool for Perioperative MEAGAN     Endocrine:  No endocrine diagnosis or  significant findings on chart review or clinical presentation and evaluation. No further testing or intervention is indicated at this time.    Hematologic:  No hematologic diagnosis, however patient is at an increased risk for DVT  Caprini Score 4, patient at Low risk for perioperative DVT.  Patient provided with VTE education/handout.    Gastrointestinal:   No GI diagnosis or significant findings on chart review or clinical presentation and evaluation.   Eat-10 score 0  Apfel 1    Infectious disease:   No infectious diagnosis or significant findings on chart review or clinical presentation and evaluation.   Prescription provided for CHG body wash and dental rinse. CHG use instructions reviewed and provided to patient.  Staph screen collected    Musculoskeletal:   No diagnosis or significant findings on chart review or clinical presentation and evaluation.     Anesthesia/Airway:  Sensitive to pain medications such as oxycodone, gets hypotensive.     Medication instructions and NPO guidelines reviewed with the patient.  All questions or concerns discussed and addressed.      Patient seen and staffed with Dr. Ennis.    Ari Gallegos DO  Anesthesiology PGY3       [1] No past medical history on file.  [2] No past surgical history on file.  [3]   Allergies  Allergen Reactions    Ciprofloxacin Unknown    Dupilumab Other    Metronidazole Unknown    Oxycodone Unknown

## 2025-05-11 NOTE — PREPROCEDURE INSTRUCTIONS
Thank you for visiting [facility] today for your pre-procedure evaluation, you were seen by Dr. Ennis    This summary includes instructions and information to aid you during your perioperative period.  Please read carefully. If you have any questions about your visit today, please call the number listed above.  If you become ill or have any changes to your health before your surgery, please contact your primary care provider and alert your surgeon.    Preparing for your Surgery       Exercises  Preoperative Deep Breathing Exercises  Why it is important to do deep breathing exercises before my surgery?  Deep breathing exercises strengthen your breathing muscles.  This helps you to recover after your surgery and decreases the chance of breathing complications.  How are the deep breathing exercises done?  Sit straight with your back supported.  Breathe in deeply and slowly through your nose. Your lower rib cage should expand and your abdomen may move forward.  Hold that breath for 3 to 5 seconds.  Breathe out through pursed lips, slowly and completely.  Rest and repeat 10 times every hour while awake.  Rest longer if you become dizzy or lightheaded.    Preoperative Brain Exercises    What are brain exercises?  A brain exercise is any activity that engages your thinking (cognitive) skills.    What types of activities are considered brain exercises?  Jigsaw puzzles, crossword puzzles, word jumble, memory games, word search, and many more.  Many can be found free online or on your phone via a mobile amelie.    Why should I do brain exercises before my surgery?  More recent research has shown brain exercise before surgery can lower the risk of postoperative delirium (confusion) which can be especially important for older adults.  Patients who did brain exercises for 5 to 10 hours the days before surgery, cut their risk of postoperative delirium in half up to 1 week after  surgery.    Sit-to-Stand Exercise    What is the sit-to-stand exercise?  The sit-to-stand exercise strengthens the muscles of your lower body and muscles in the center of your body (core muscles for stability) helping to maintain and improve your strength and mobility.  How do I do the sit-to-stand exercise?  The goal is to do this exercise without using your arms or hands.  If this is too difficult, use your arms and hands or a chair with armrests to help slowly push yourself to the standing position and lower yourself back to the sitting position. As the movement becomes easier use your arms and hands less.    Steps to the sit-to-stand exercise  Sit up tall in a sturdy chair, knees bent, feet flat on the floor shoulder-width apart.  Shift your hips/pelvis forward in the chair to correctly position yourself for the next movement.  Lean forward at your hips.  Stand up straight putting equal weight on both feet.  Check to be sure you are properly aligned with the chair, in a slow controlled movement sit back down.  Repeat this exercise 10-15 times.  If needed you can do it fewer times until your strength improves.  Rest for 1 minute.  Do another 10-15 sit-to-stand exercises.  Try to do this in the morning and evening.        Instructions    Preoperative Fasting Guidelines    Why must I stop eating and drinking near surgery time?  With sedation, food or liquid in your stomach can enter your lungs causing serious complications  Food can increase nausea and vomiting  When do I need to stop eating and drinking before my surgery?      Do not eat any food after midnight the night before your surgery/procedure. You may have up to 13.5 ounces of clear liquid until TWO hours before your instructed arrival time to the hospital.  This includes water, black tea/coffee, (no milk or cream) apple juice, and electrolyte drinks (Gatorade). You may chew gum until TWO hours before your surgery/procedure , Do not eat any food or drink  any liquids after midnight the night before your surgery/procedure.  You may have sips of water to take medications.            Simple things you can do to help prevent blood clots     Blood clots are blockages that can form in the body's veins. When a blood clot forms in your deep veins, it may be called a deep vein thrombosis, or DVT for short. Blood clots can happen in any part of the body where blood flows, but they are most common in the arms and legs. If a piece of a blood clot breaks free and travels to the lungs, it is called a pulmonary embolus (PE). A PE can be a very serious problem.         Being in the hospital or having surgery can raise your chances of getting a blood clot because you may not be well enough to move around as much as you normally do.         Ways you can help prevent blood clots in the hospital       Wearing SCDs  SCDs stands for Sequential Compression Devices.   SCDs are special sleeves that wrap around your legs. They attach to a pump that fills them with air to gently squeeze your legs every few minutes.  This helps return the blood in your legs to your heart.   SCDs should only be taken off when walking or bathing. SCDs may not be comfortable, but they can help save your life.              Pump SCD leg sleeves  Wearing compression stockings - if your doctor orders them. These special snug-fitting stockings gently squeeze your legs to help blood flow.       Walking. Walking helps move the blood in your legs.   If your doctor says it is ok, try walking the halls at least   5 times a day. Ask us to help you get up, so you don't fall.      Taking any blood-thinning medicines your doctor orders.              Ways you can help prevent blood clots at home         Wearing compression stockings - if your doctor orders them.   Walking - to help move the blood in your legs.    Taking any blood-thinning medicines your doctor orders.      Signs of a blood clot or PE    Tell your doctor or nurse  right away if you have any of the problems listed below.         If you are at home, seek medical care right away. Call 911 for chest pain or problems breathing.            Signs of a blood clot (DVT) - such as pain, swelling, redness, or warmth in your arm or legs.  Signs of a pulmonary embolism (PE) - such as chest pain or feeling short of breath      Tobacco and Alcohol;  Do not drink alcohol or smoke within 24 hours of surgery.  It is best to quit smoking for as long as possible before any surgery or procedure.    Quitting Smoking; Recognizing Dangerous Situations:  1-Alcohol use during the first month after quitting, 2-Being around smoke or someone who smokes 3-Times situation routinely smoked  4-Triggers-car, breaks, coffee, when awakening, social events  Coping Skills: 1-Learning new ways to manage stress 2-Exercising 3-Relaxation breathing   4-Change routines 5-Distraction techniques    Websites:  Smoke-Free - offers free text messages and an amelie to help you quit. Info includes eating and mood issues that may come with quitting. There is a Live Helpline to talk to an expert. Go to smokefree.gov; Become an Ex-Smoker - the free EX Plan is based on scientific research and useful advice from ex-smokers. It isn't just about quitting smoking.  It's about re-learning life without cigarettes using a 3-step program.  Go to becomeanex.org   Centers for Disease Control offer many suggestions for helping you quit. Includes a Quit Guide and real-life stories. There are sections for specific groups such as LGBT, , different ethnic groups, and pregnant women.  Go to cdc.gov/tobacco/campaign/tips    Other Resources:  [place] Tobacco Quit Line - call 1-800-QUIT-NOW or 1-879.131.7439.  [facility] 2-1-1 - to find local programs and resources. Call 211 or go to 211.org.  [facility] Tobacco Cessation Program - call 669-573-0723.  American Lung Association offers classes for quitting smoking. Some places may charge  a fee. For a list of classes, go to lung.org or call 4-777[facility].     Some things to think about:; The health benefits of quitting smoking can help most of the major parts of your body. There is no safe amount of cigarette smoke. Quitting smoking can add years to your life. When you quit, you'll also protect your loved ones from dangerous secondhand smoke. Make a plan, join a support group, and talk to your physician to assist in quitting smoking.                                                                                                              , Quitting Alcohol; Things to think about: Alcohol use disorder is a health condition that can improve with treatment. Each person is unique. A treatment that is good for one person may not be a good fit for someone else. Simply knowing the different options can be an important first step. Treatment can be inpatient, where you stay at a facility, or outpatient, where you stay at home. Your insurance plan may cover some treatment costs.   Resources:    NIAAA Alcohol Treatment Navigator - from the National [place] on Alcohol Abuse and  Alcoholism. Offers an online tool to help you find the right treatment for you - near you. Go to alcoholtreatment.niaaa.nih.gov.  [facility] National Hotline  - from the Substance Abuse and Mental Health Services Administration. A free, confidential 24-hour treatment referral and information service for anyone facing mental and/or substance use disorders. Go to findtreatment.gov or call 2-970-353-HELP (0885).    Alcoholics Anonymous (AA) - offers group meetings and a 12-step program to anyone who has a drinking problem. Go to aa.org or call 920-152-7274    [facility] 2-1-1 - to find local programs and resources. Call 211 or go to 211.org    Other people you can talk to about treatment options include your primary care doctor, health insurance plan, local health department, or employee assistance program. You can also ask to talk to a  "hospital .          Other Instructions  Why did I have my nose, under my arms, and groin swabbed? The purpose of the swab is to identify Staphylococcus aureus inside your nose or on your skin.  The swab was sent to the laboratory for culture.  A positive swab/culture for Staphylococcus aureus is called colonization or carriage.   What is Staphylococcus aureus? Staphylococcus aureus, also known as \"staph\", is a germ found on the skin or in the nose of healthy people.  Sometimes Staphylococcus aureus can get into the body and cause an infection.  This can be minor (such as pimples, boils, or other skin problems).  It might also be serious (such as a blood infection, pneumonia, or a surgical site infection). What is Staphylococcus aureus colonization or carriage? Colonization or carriage means that a person has the germ but is not sick from it.  These bacteria can be spread on the hands or when breathing or sneezing. How is Staphylococcus aureus spread? It is most often spread by close contact with a person or item that carries it. What happens if my culture is positive for Staphylococcus aureus? Your doctor/medical team will use this information to guide any antibiotic treatment which may be necessary.  Regardless of the culture results, we will clean the inside of your nose with a betadine swab just before you have your surgery. Will I get an infection if I have Staphylococcus aureus in my nose or on my skin? Anyone can get an infection with Staphylococcus aureus.  However, the best way to reduce your risk of infection is to follow the instructions provided to you for the use of your CHG soap and dental rinse.  , Body Wash; What is a home preoperative antibacterial shower? This shower is a way of cleaning the skin with a germ-killing solution before surgery.  The solution contains chlorhexidine, commonly known as CHG.  CHG is a skin cleanser with germ-killing ability.  Let your doctor know if you are " allergic to chlorhexidine. Why do I need to take a preoperative antibacterial shower? Skin is not sterile.  It is best to try to make your skin as free of germs as possible before surgery.  Proper cleansing with a germ-killing soap before surgery can lower the number of germs on your skin.  This helps to reduce the risk of infection at the surgical site.  Following the instructions listed below will help you prepare your skin for surgery.   How do I use the solution? Steps:  Begin using your CHG soap 5 days before your scheduled surgery on ___________.   First, wash and rinse your hair using the CHG soap. Keep CHG soap away from ear canals and eyes.  Rinse completely, do not condition.  Hair extensions should be removed. , Oral/Dental Rinse: What is oral/dental rinse?  It is a mouthwash. It is a way of cleaning the mouth with a germ-killing solution before your surgery.  The solution contains chlorhexidine, commonly known as CHG. It is used inside the mouth to kill a bacteria known as Staphylococcus aureus.  Let your doctor know if you are allergic to Chlorhexidine. Why do I need to use CHG oral/dental rinse? The CHG oral/dental rinse helps to kill a bacteria in your mouth known as Staphylococcus aureus.  This reduces the risk of infection at the surgical site.  Using your CHG oral/dental rinse STEPS: Use your CHG oral/dental rinse after you brush your teeth the night before (at bedtime) and the morning of your surgery.  Follow all directions on your prescription label.  Use the cap on the container to measure 15 ml.  Swish (gargle if you can) the mouthwash in your mouth for at least 30 seconds, (do not swallow) and spit out.  After you use your CHG rinse, do not rinse your mouth with water, drink or eat.  Please refer to the prescription label for the appropriate time to resume oral intake What side effects might I have using the CHG oral/dental rinse? CHG rinse will stick to plaque on the teeth.  Brush and floss  just before use.  Teeth brushing will help avoid staining of plaque during use.     The Week before Surgery        Seven days before Surgery  Check your [facility] medication instructions  Do the exercises provided to you by [facility]   Arrange for a responsible, adult licensed  to take you home after surgery and stay with you for 24 hours.  You will not be permitted to drive yourself home if you have received any anesthetic/sedation  Six days before surgery  Check your [facility] medication instructions  Do the exercises provided to you by [facility]   Start using Chlorhexidene (CHG) body wash if prescribed  Five days before surgery  Check your [facility] medication instructions  Do the exercises provided to you by [facility]   Continue to use CHG body wash if prescribed  Three days before surgery  Check your [facility] medication instructions  Do the exercises provided to you by [facility]   Continue to use CHG body wash if prescribed  Two days before surgery  Check your [facility] medication instructions  Do the exercises provided to you by [facility]   Continue to use CHG body wash if prescribed    The Day before Surgery       Check your [facility] medication and all other [facility] instructions including when to stop eating and drinking  You will be called with your arrival time for surgery in the late afternoon.  If you do not receive a call please reach out to your surgeon's office.  Do not smoke or drink 24 hours before surgery  Prepare items to bring with you to the hospital  Shower with your chlorhexidine wash if prescribed  Brush your teeth and use your chlorhexidine dental rinse if prescribed    The Day of Surgery       Check your [facility] medication instructions  Ensure you follow the instructions for when to stop eating and drinking  Shower, if prescribed use CHG.  Do not apply any lotions, creams, moisturizers, perfume or deodorant  Brush your teeth and use your CHG dental rinse if prescribed  Wear loose comfortable clothing  Avoid make-up  Remove  jewelry and piercings, consider  professional piercing removal with a plastic spacer if needed  Bring photo ID and Insurance card  Bring an accurate medication list that includes medication dose, frequency and allergies  Bring a copy of your advanced directives (will, health care power of )  Bring any devices and controllers as well as medical devices you have been provided with for surgery (CPAP, slings, braces, etc.)  Dentures, eyeglasses, and contacts will be removed before surgery, please bring cases for contacts or glasses

## 2025-05-12 ENCOUNTER — PRE-ADMISSION TESTING (OUTPATIENT)
Dept: PREADMISSION TESTING | Facility: HOSPITAL | Age: 75
End: 2025-05-12
Payer: MEDICARE

## 2025-05-12 ENCOUNTER — ANESTHESIA EVENT (OUTPATIENT)
Dept: OPERATING ROOM | Facility: HOSPITAL | Age: 75
End: 2025-05-12
Payer: MEDICARE

## 2025-05-12 VITALS
SYSTOLIC BLOOD PRESSURE: 148 MMHG | BODY MASS INDEX: 29.74 KG/M2 | TEMPERATURE: 98.7 F | WEIGHT: 200.8 LBS | DIASTOLIC BLOOD PRESSURE: 84 MMHG | HEIGHT: 69 IN | OXYGEN SATURATION: 96 % | HEART RATE: 66 BPM

## 2025-05-12 DIAGNOSIS — Z01.818 PREOPERATIVE CLEARANCE: ICD-10-CM

## 2025-05-12 DIAGNOSIS — Z01.810 PREOPERATIVE CARDIOVASCULAR EXAMINATION: ICD-10-CM

## 2025-05-12 DIAGNOSIS — Z41.9 SURGERY, ELECTIVE: Primary | ICD-10-CM

## 2025-05-12 DIAGNOSIS — Z01.811 PREOPERATIVE RESPIRATORY EXAMINATION: ICD-10-CM

## 2025-05-12 LAB
ANION GAP SERPL CALC-SCNC: 14 MMOL/L (ref 10–20)
BUN SERPL-MCNC: 14 MG/DL (ref 6–23)
CALCIUM SERPL-MCNC: 9.5 MG/DL (ref 8.6–10.6)
CHLORIDE SERPL-SCNC: 105 MMOL/L (ref 98–107)
CO2 SERPL-SCNC: 26 MMOL/L (ref 21–32)
CREAT SERPL-MCNC: 1.14 MG/DL (ref 0.5–1.3)
EGFRCR SERPLBLD CKD-EPI 2021: 68 ML/MIN/1.73M*2
ERYTHROCYTE [DISTWIDTH] IN BLOOD BY AUTOMATED COUNT: 13.5 % (ref 11.5–14.5)
GLUCOSE SERPL-MCNC: 99 MG/DL (ref 74–99)
HCT VFR BLD AUTO: 49.4 % (ref 41–52)
HGB BLD-MCNC: 16 G/DL (ref 13.5–17.5)
MCH RBC QN AUTO: 29.4 PG (ref 26–34)
MCHC RBC AUTO-ENTMCNC: 32.4 G/DL (ref 32–36)
MCV RBC AUTO: 91 FL (ref 80–100)
NRBC BLD-RTO: 0 /100 WBCS (ref 0–0)
PLATELET # BLD AUTO: 268 X10*3/UL (ref 150–450)
POTASSIUM SERPL-SCNC: 4.5 MMOL/L (ref 3.5–5.3)
RBC # BLD AUTO: 5.44 X10*6/UL (ref 4.5–5.9)
SODIUM SERPL-SCNC: 140 MMOL/L (ref 136–145)
WBC # BLD AUTO: 9.1 X10*3/UL (ref 4.4–11.3)

## 2025-05-12 PROCEDURE — 82374 ASSAY BLOOD CARBON DIOXIDE: CPT

## 2025-05-12 PROCEDURE — 99204 OFFICE O/P NEW MOD 45 MIN: CPT | Performed by: ANESTHESIOLOGY

## 2025-05-12 PROCEDURE — 85027 COMPLETE CBC AUTOMATED: CPT

## 2025-05-12 PROCEDURE — 36415 COLL VENOUS BLD VENIPUNCTURE: CPT

## 2025-05-12 RX ORDER — CHLORHEXIDINE GLUCONATE ORAL RINSE 1.2 MG/ML
15 SOLUTION DENTAL DAILY
Qty: 30 ML | Refills: 0 | Status: SHIPPED | OUTPATIENT
Start: 2025-05-12 | End: 2025-05-14

## 2025-05-12 RX ORDER — CHLORHEXIDINE GLUCONATE 40 MG/ML
1 SOLUTION TOPICAL DAILY
Qty: 25 ML | Refills: 0 | Status: SHIPPED | OUTPATIENT
Start: 2025-05-12 | End: 2025-05-17

## 2025-05-12 ASSESSMENT — DUKE ACTIVITY SCORE INDEX (DASI)
CAN YOU RUN A SHORT DISTANCE: YES
DASI METS SCORE: 9
CAN YOU DO LIGHT WORK AROUND THE HOUSE LIKE DUSTING OR WASHING DISHES: YES
CAN YOU PARTICIPATE IN STRENOUS SPORTS LIKE SWIMMING, SINGLES TENNIS, FOOTBALL, BASKETBALL, OR SKIING: NO
CAN YOU WALK A BLOCK OR TWO ON LEVEL GROUND: YES
CAN YOU TAKE CARE OF YOURSELF (EAT, DRESS, BATHE, OR USE TOILET): YES
TOTAL_SCORE: 50.7
CAN YOU HAVE SEXUAL RELATIONS: YES
CAN YOU WALK INDOORS, SUCH AS AROUND YOUR HOUSE: YES
CAN YOU PARTICIPATE IN MODERATE RECREATIONAL ACTIVITIES LIKE GOLF, BOWLING, DANCING, DOUBLES TENNIS OR THROWING A BASEBALL OR FOOTBALL: YES
CAN YOU CLIMB A FLIGHT OF STAIRS OR WALK UP A HILL: YES
CAN YOU DO YARD WORK LIKE RAKING LEAVES, WEEDING OR PUSHING A MOWER: YES
CAN YOU DO HEAVY WORK AROUND THE HOUSE LIKE SCRUBBING FLOORS OR LIFTING AND MOVING HEAVY FURNITURE: YES
CAN YOU DO MODERATE WORK AROUND THE HOUSE LIKE VACUUMING, SWEEPING FLOORS OR CARRYING GROCERIES: YES

## 2025-05-12 ASSESSMENT — ENCOUNTER SYMPTOMS
NECK NEGATIVE: 1
EYES NEGATIVE: 1
MUSCULOSKELETAL NEGATIVE: 1
RHINORRHEA: 1
SINUS CONGESTION: 1
GASTROINTESTINAL NEGATIVE: 1
NEUROLOGICAL NEGATIVE: 1
RESPIRATORY NEGATIVE: 1
ENDOCRINE NEGATIVE: 1
CONSTITUTIONAL NEGATIVE: 1
CARDIOVASCULAR NEGATIVE: 1

## 2025-05-20 ENCOUNTER — ANESTHESIA (OUTPATIENT)
Dept: OPERATING ROOM | Facility: HOSPITAL | Age: 75
End: 2025-05-20
Payer: MEDICARE

## 2025-05-20 ENCOUNTER — HOSPITAL ENCOUNTER (OUTPATIENT)
Facility: HOSPITAL | Age: 75
Setting detail: OUTPATIENT SURGERY
Discharge: HOME | End: 2025-05-20
Attending: OTOLARYNGOLOGY | Admitting: OTOLARYNGOLOGY
Payer: MEDICARE

## 2025-05-20 VITALS
SYSTOLIC BLOOD PRESSURE: 139 MMHG | RESPIRATION RATE: 16 BRPM | OXYGEN SATURATION: 97 % | BODY MASS INDEX: 29.32 KG/M2 | DIASTOLIC BLOOD PRESSURE: 84 MMHG | HEART RATE: 64 BPM | TEMPERATURE: 96.8 F | WEIGHT: 197.97 LBS | HEIGHT: 69 IN

## 2025-05-20 DIAGNOSIS — J34.89 NASAL DRAINAGE: ICD-10-CM

## 2025-05-20 DIAGNOSIS — J33.8 POLYP OF NASAL SINUS: ICD-10-CM

## 2025-05-20 DIAGNOSIS — J34.3 HYPERTROPHY OF NASAL TURBINATES: ICD-10-CM

## 2025-05-20 DIAGNOSIS — J32.4 CHRONIC PANSINUSITIS: ICD-10-CM

## 2025-05-20 PROBLEM — I10 HTN (HYPERTENSION): Status: ACTIVE | Noted: 2025-05-20

## 2025-05-20 PROBLEM — J45.909 MILD ASTHMA (HHS-HCC): Status: ACTIVE | Noted: 2025-05-20

## 2025-05-20 LAB
ABO GROUP (TYPE) IN BLOOD: NORMAL
ANTIBODY SCREEN: NORMAL
RH FACTOR (ANTIGEN D): NORMAL

## 2025-05-20 PROCEDURE — 86901 BLOOD TYPING SEROLOGIC RH(D): CPT | Performed by: ANESTHESIOLOGY

## 2025-05-20 PROCEDURE — 31267 ENDOSCOPY MAXILLARY SINUS: CPT | Performed by: OTOLARYNGOLOGY

## 2025-05-20 PROCEDURE — 99100 ANES PT EXTEME AGE<1 YR&>70: CPT | Performed by: ANESTHESIOLOGY

## 2025-05-20 PROCEDURE — 31259 NSL/SINS NDSC SPHN TISS RMVL: CPT | Performed by: OTOLARYNGOLOGY

## 2025-05-20 PROCEDURE — 31276 NSL/SINS NDSC FRNT TISS RMVL: CPT | Performed by: OTOLARYNGOLOGY

## 2025-05-20 PROCEDURE — 88305 TISSUE EXAM BY PATHOLOGIST: CPT | Performed by: STUDENT IN AN ORGANIZED HEALTH CARE EDUCATION/TRAINING PROGRAM

## 2025-05-20 PROCEDURE — 7100000009 HC PHASE TWO TIME - INITIAL BASE CHARGE: Performed by: OTOLARYNGOLOGY

## 2025-05-20 PROCEDURE — 7100000001 HC RECOVERY ROOM TIME - INITIAL BASE CHARGE: Performed by: OTOLARYNGOLOGY

## 2025-05-20 PROCEDURE — 2500000004 HC RX 250 GENERAL PHARMACY W/ HCPCS (ALT 636 FOR OP/ED): Mod: TB

## 2025-05-20 PROCEDURE — 2500000001 HC RX 250 WO HCPCS SELF ADMINISTERED DRUGS (ALT 637 FOR MEDICARE OP)

## 2025-05-20 PROCEDURE — 3700000001 HC GENERAL ANESTHESIA TIME - INITIAL BASE CHARGE: Performed by: OTOLARYNGOLOGY

## 2025-05-20 PROCEDURE — 36415 COLL VENOUS BLD VENIPUNCTURE: CPT | Performed by: ANESTHESIOLOGY

## 2025-05-20 PROCEDURE — 3700000002 HC GENERAL ANESTHESIA TIME - EACH INCREMENTAL 1 MINUTE: Performed by: OTOLARYNGOLOGY

## 2025-05-20 PROCEDURE — 2500000004 HC RX 250 GENERAL PHARMACY W/ HCPCS (ALT 636 FOR OP/ED): Performed by: OTOLARYNGOLOGY

## 2025-05-20 PROCEDURE — 30140 RESECT INFERIOR TURBINATE: CPT | Performed by: OTOLARYNGOLOGY

## 2025-05-20 PROCEDURE — 88305 TISSUE EXAM BY PATHOLOGIST: CPT | Mod: TC,SUR | Performed by: OTOLARYNGOLOGY

## 2025-05-20 PROCEDURE — 7100000002 HC RECOVERY ROOM TIME - EACH INCREMENTAL 1 MINUTE: Performed by: OTOLARYNGOLOGY

## 2025-05-20 PROCEDURE — 3600000018 HC OR TIME - INITIAL BASE CHARGE - PROCEDURE LEVEL SIX: Performed by: OTOLARYNGOLOGY

## 2025-05-20 PROCEDURE — 2720000007 HC OR 272 NO HCPCS: Performed by: OTOLARYNGOLOGY

## 2025-05-20 PROCEDURE — A31253 PR NASAL/SINUS ENDOSCOPY, SURGICAL WITH ETHMOIDECTOMY; TOTAL (ANTERIOR AND POSTERIOR), I: Performed by: ANESTHESIOLOGY

## 2025-05-20 PROCEDURE — 2500000001 HC RX 250 WO HCPCS SELF ADMINISTERED DRUGS (ALT 637 FOR MEDICARE OP): Performed by: OTOLARYNGOLOGY

## 2025-05-20 PROCEDURE — 2500000005 HC RX 250 GENERAL PHARMACY W/O HCPCS: Mod: JZ | Performed by: OTOLARYNGOLOGY

## 2025-05-20 PROCEDURE — 3600000017 HC OR TIME - EACH INCREMENTAL 1 MINUTE - PROCEDURE LEVEL SIX: Performed by: OTOLARYNGOLOGY

## 2025-05-20 PROCEDURE — 7100000010 HC PHASE TWO TIME - EACH INCREMENTAL 1 MINUTE: Performed by: OTOLARYNGOLOGY

## 2025-05-20 PROCEDURE — 61782 SCAN PROC CRANIAL EXTRA: CPT | Performed by: OTOLARYNGOLOGY

## 2025-05-20 RX ORDER — LIDOCAINE HYDROCHLORIDE AND EPINEPHRINE 10; 10 UG/ML; MG/ML
INJECTION, SOLUTION INFILTRATION; PERINEURAL AS NEEDED
Status: DISCONTINUED | OUTPATIENT
Start: 2025-05-20 | End: 2025-05-20 | Stop reason: HOSPADM

## 2025-05-20 RX ORDER — FENTANYL CITRATE 50 UG/ML
INJECTION, SOLUTION INTRAMUSCULAR; INTRAVENOUS CONTINUOUS PRN
Status: DISCONTINUED | OUTPATIENT
Start: 2025-05-20 | End: 2025-05-20

## 2025-05-20 RX ORDER — METHOCARBAMOL 100 MG/ML
1000 INJECTION, SOLUTION INTRAMUSCULAR; INTRAVENOUS ONCE AS NEEDED
Status: DISCONTINUED | OUTPATIENT
Start: 2025-05-20 | End: 2025-05-20

## 2025-05-20 RX ORDER — REMIFENTANIL HYDROCHLORIDE 1 MG/ML
INJECTION, POWDER, LYOPHILIZED, FOR SOLUTION INTRAVENOUS CONTINUOUS PRN
Status: DISCONTINUED | OUTPATIENT
Start: 2025-05-20 | End: 2025-05-20

## 2025-05-20 RX ORDER — TRAMADOL HYDROCHLORIDE 50 MG/1
50 TABLET, FILM COATED ORAL EVERY 8 HOURS PRN
Qty: 10 TABLET | Refills: 0 | Status: SHIPPED | OUTPATIENT
Start: 2025-05-20

## 2025-05-20 RX ORDER — PROPOFOL 10 MG/ML
INJECTION, EMULSION INTRAVENOUS AS NEEDED
Status: DISCONTINUED | OUTPATIENT
Start: 2025-05-20 | End: 2025-05-20

## 2025-05-20 RX ORDER — ROCURONIUM BROMIDE 10 MG/ML
INJECTION, SOLUTION INTRAVENOUS AS NEEDED
Status: DISCONTINUED | OUTPATIENT
Start: 2025-05-20 | End: 2025-05-20

## 2025-05-20 RX ORDER — DROPERIDOL 2.5 MG/ML
0.62 INJECTION, SOLUTION INTRAMUSCULAR; INTRAVENOUS ONCE AS NEEDED
Status: DISCONTINUED | OUTPATIENT
Start: 2025-05-20 | End: 2025-05-20 | Stop reason: HOSPADM

## 2025-05-20 RX ORDER — HYDROMORPHONE HYDROCHLORIDE 1 MG/ML
INJECTION, SOLUTION INTRAMUSCULAR; INTRAVENOUS; SUBCUTANEOUS AS NEEDED
Status: DISCONTINUED | OUTPATIENT
Start: 2025-05-20 | End: 2025-05-20

## 2025-05-20 RX ORDER — LIDOCAINE HYDROCHLORIDE 10 MG/ML
0.1 INJECTION, SOLUTION INFILTRATION; PERINEURAL ONCE
Status: DISCONTINUED | OUTPATIENT
Start: 2025-05-20 | End: 2025-05-20 | Stop reason: HOSPADM

## 2025-05-20 RX ORDER — ONDANSETRON HYDROCHLORIDE 2 MG/ML
4 INJECTION, SOLUTION INTRAVENOUS ONCE AS NEEDED
Status: DISCONTINUED | OUTPATIENT
Start: 2025-05-20 | End: 2025-05-20 | Stop reason: HOSPADM

## 2025-05-20 RX ORDER — MIDAZOLAM HYDROCHLORIDE 1 MG/ML
INJECTION INTRAMUSCULAR; INTRAVENOUS AS NEEDED
Status: DISCONTINUED | OUTPATIENT
Start: 2025-05-20 | End: 2025-05-20

## 2025-05-20 RX ORDER — CEFAZOLIN 1 G/1
INJECTION, POWDER, FOR SOLUTION INTRAVENOUS AS NEEDED
Status: DISCONTINUED | OUTPATIENT
Start: 2025-05-20 | End: 2025-05-20

## 2025-05-20 RX ORDER — HYDROMORPHONE HYDROCHLORIDE 0.2 MG/ML
0.2 INJECTION INTRAMUSCULAR; INTRAVENOUS; SUBCUTANEOUS EVERY 5 MIN PRN
Status: DISCONTINUED | OUTPATIENT
Start: 2025-05-20 | End: 2025-05-20 | Stop reason: HOSPADM

## 2025-05-20 RX ORDER — ACETAMINOPHEN 325 MG/1
975 TABLET ORAL EVERY 6 HOURS PRN
Status: DISCONTINUED | OUTPATIENT
Start: 2025-05-20 | End: 2025-05-20 | Stop reason: HOSPADM

## 2025-05-20 RX ORDER — OXYMETAZOLINE HCL 0.05 %
SPRAY, NON-AEROSOL (ML) NASAL AS NEEDED
Status: DISCONTINUED | OUTPATIENT
Start: 2025-05-20 | End: 2025-05-20 | Stop reason: HOSPADM

## 2025-05-20 RX ORDER — SODIUM CHLORIDE 0.9 G/100ML
INJECTION, SOLUTION IRRIGATION AS NEEDED
Status: DISCONTINUED | OUTPATIENT
Start: 2025-05-20 | End: 2025-05-20 | Stop reason: HOSPADM

## 2025-05-20 RX ORDER — SODIUM CHLORIDE, SODIUM LACTATE, POTASSIUM CHLORIDE, CALCIUM CHLORIDE 600; 310; 30; 20 MG/100ML; MG/100ML; MG/100ML; MG/100ML
100 INJECTION, SOLUTION INTRAVENOUS CONTINUOUS
Status: DISCONTINUED | OUTPATIENT
Start: 2025-05-20 | End: 2025-05-20 | Stop reason: HOSPADM

## 2025-05-20 RX ORDER — ONDANSETRON HYDROCHLORIDE 2 MG/ML
INJECTION, SOLUTION INTRAVENOUS AS NEEDED
Status: DISCONTINUED | OUTPATIENT
Start: 2025-05-20 | End: 2025-05-20

## 2025-05-20 RX ORDER — PHENYLEPHRINE 10 MG/250 ML(40 MCG/ML)IN 0.9 % SOD.CHLORIDE INTRAVENOUS
CONTINUOUS PRN
Status: DISCONTINUED | OUTPATIENT
Start: 2025-05-20 | End: 2025-05-20

## 2025-05-20 RX ORDER — LIDOCAINE HYDROCHLORIDE 20 MG/ML
INJECTION, SOLUTION INFILTRATION; PERINEURAL AS NEEDED
Status: DISCONTINUED | OUTPATIENT
Start: 2025-05-20 | End: 2025-05-20

## 2025-05-20 RX ORDER — ESMOLOL HYDROCHLORIDE 10 MG/ML
INJECTION INTRAVENOUS AS NEEDED
Status: DISCONTINUED | OUTPATIENT
Start: 2025-05-20 | End: 2025-05-20

## 2025-05-20 RX ORDER — PHENYLEPHRINE HCL IN 0.9% NACL 0.4MG/10ML
SYRINGE (ML) INTRAVENOUS AS NEEDED
Status: DISCONTINUED | OUTPATIENT
Start: 2025-05-20 | End: 2025-05-20

## 2025-05-20 RX ORDER — DOXYCYCLINE 100 MG/1
100 CAPSULE ORAL 2 TIMES DAILY
Qty: 14 CAPSULE | Refills: 0 | Status: SHIPPED | OUTPATIENT
Start: 2025-05-20 | End: 2025-06-02 | Stop reason: WASHOUT

## 2025-05-20 RX ADMIN — ACETAMINOPHEN 975 MG: 325 TABLET ORAL at 10:37

## 2025-05-20 RX ADMIN — CEFAZOLIN 2 G: 1 INJECTION, POWDER, FOR SOLUTION INTRAMUSCULAR; INTRAVENOUS at 07:36

## 2025-05-20 RX ADMIN — Medication 120 MCG: at 07:53

## 2025-05-20 RX ADMIN — LIDOCAINE HYDROCHLORIDE 80 MG: 20 INJECTION, SOLUTION INFILTRATION; PERINEURAL at 07:24

## 2025-05-20 RX ADMIN — REMIFENTANIL HYDROCHLORIDE 20 MCG: 1 INJECTION, POWDER, LYOPHILIZED, FOR SOLUTION INTRAVENOUS at 07:48

## 2025-05-20 RX ADMIN — PROPOFOL 200 MG: 10 INJECTION, EMULSION INTRAVENOUS at 07:24

## 2025-05-20 RX ADMIN — Medication 200 MCG: at 08:00

## 2025-05-20 RX ADMIN — PHENYLEPHRINE-NACL IV SOLUTION 10 MG/250ML-0.9% 0.2 MCG/KG/MIN: 10-0.9/25 SOLUTION at 08:03

## 2025-05-20 RX ADMIN — REMIFENTANIL HYDROCHLORIDE 10 MCG: 1 INJECTION, POWDER, LYOPHILIZED, FOR SOLUTION INTRAVENOUS at 09:24

## 2025-05-20 RX ADMIN — HYDROMORPHONE HYDROCHLORIDE 0.4 MG: 1 INJECTION, SOLUTION INTRAMUSCULAR; INTRAVENOUS; SUBCUTANEOUS at 09:38

## 2025-05-20 RX ADMIN — MIDAZOLAM HYDROCHLORIDE 2 MG: 2 INJECTION, SOLUTION INTRAMUSCULAR; INTRAVENOUS at 07:20

## 2025-05-20 RX ADMIN — SUGAMMADEX 200 MG: 100 INJECTION, SOLUTION INTRAVENOUS at 09:58

## 2025-05-20 RX ADMIN — ROCURONIUM BROMIDE 20 MG: 10 INJECTION, SOLUTION INTRAVENOUS at 08:38

## 2025-05-20 RX ADMIN — DEXAMETHASONE SODIUM PHOSPHATE 10 MG: 4 INJECTION INTRA-ARTICULAR; INTRALESIONAL; INTRAMUSCULAR; INTRAVENOUS; SOFT TISSUE at 07:36

## 2025-05-20 RX ADMIN — Medication 160 MCG: at 07:57

## 2025-05-20 RX ADMIN — REMIFENTANIL HYDROCHLORIDE 20 MCG: 1 INJECTION, POWDER, LYOPHILIZED, FOR SOLUTION INTRAVENOUS at 07:30

## 2025-05-20 RX ADMIN — Medication 80 MCG: at 08:11

## 2025-05-20 RX ADMIN — ESMOLOL HYDROCHLORIDE 100 MG: 100 INJECTION, SOLUTION INTRAVENOUS at 07:32

## 2025-05-20 RX ADMIN — ROCURONIUM BROMIDE 50 MG: 10 INJECTION, SOLUTION INTRAVENOUS at 07:25

## 2025-05-20 RX ADMIN — Medication 120 MCG: at 07:39

## 2025-05-20 RX ADMIN — ONDANSETRON 4 MG: 2 INJECTION, SOLUTION INTRAMUSCULAR; INTRAVENOUS at 09:45

## 2025-05-20 RX ADMIN — SODIUM CHLORIDE, SODIUM LACTATE, POTASSIUM CHLORIDE, AND CALCIUM CHLORIDE: 600; 310; 30; 20 INJECTION, SOLUTION INTRAVENOUS at 07:22

## 2025-05-20 SDOH — HEALTH STABILITY: MENTAL HEALTH: CURRENT SMOKER: 0

## 2025-05-20 ASSESSMENT — PAIN SCALES - GENERAL
PAINLEVEL_OUTOF10: 2
PAINLEVEL_OUTOF10: 0 - NO PAIN
PAINLEVEL_OUTOF10: 3
PAINLEVEL_OUTOF10: 2
PAINLEVEL_OUTOF10: 3
PAINLEVEL_OUTOF10: 0 - NO PAIN
PAINLEVEL_OUTOF10: 3
PAINLEVEL_OUTOF10: 3
PAINLEVEL_OUTOF10: 2

## 2025-05-20 ASSESSMENT — PAIN DESCRIPTION - DESCRIPTORS
DESCRIPTORS: PRESSURE

## 2025-05-20 ASSESSMENT — PAIN - FUNCTIONAL ASSESSMENT
PAIN_FUNCTIONAL_ASSESSMENT: 0-10

## 2025-05-20 NOTE — DISCHARGE INSTRUCTIONS
Nasal and Sinus Surgery at Home Instructions  The following instructions will help you know what to expect in the days following your surgery.     Please have the following items available at your home/recovery residence:  · NeilMed Sinus Rinse® bottle or equivalent for post-surgical nasal irrigations  · Oxymetazoline (Afrin®) or Phenylephrine (Johnny-Synephrine®) are topical decongestant sprays   - they should ONLY be used if you have a nosebleed or excessive bleeding  · 4 x 4 gauze and paper tape  · Tylenol® (adjunct to prescription therapy or as sole pain medication)    Activities  · You may shower the same day as your surgery   · Avoid sneezing or blowing your nose for 2 weeks after surgery / if you do sneeze, do so with your mouth open  · Avoid strenuous activity for 2 weeks after surgery / do not lift heavy objects (greater than 10 pounds) for 1 week after surgery  · Walking is strongly encouraged after surgery   · Most patients return to work without about 5 to 7 days after surgery but this is variable    Diet  · You can resume a normal diet within 24 hours of the surgery      Fever  · A low-grade fever, less than 101° F is not uncommon for 2 to 3 days after surgery. If fever continues or is higher than 101° F, call your physician.  You can use Tylenol® to control the fever.      Pain Control  · Pain is variable after nasal surgery but is generally well controlled with pain medication.  Most patients feel pressure and congestion.  Pain should lessen with time. If pain increases, call our office.  · For mild pain, acetaminophen (Tylenol®) is recommended.  We suggest scheduling 500-1000 mg of acetaminophen every 6 hours for the first several days (unless you are allergic to this medicine or have problems with your liver).  Do not take more than 4000mg in a day.  We will also prescribe stronger pain medication for after surgery. Drink plenty of water as these stronger pain medications can be constipating.  We  also recommend that you take stool softeners on a regular basis while taking narcotic pain medication.  -Do not take ibuprofen, Advil, Motrin, aspirin, or other NSAIDS (non-steroidal anti-inflammatory). These medications increase your risk of bleeding.    Drainage  · You can expect to have bloody nasal drainage after nasal or sinus surgery. To catch   this drainage and to help avoid continuous nose wiping, we usually put a gauze “mustache” dressing under the nose and tape it to the cheeks for as long as the drainage continues.    · BLEEDING: Some blood in the nasal drainage after surgery is expected. This may be red, dark red or brown. One way to monitor this is to tape a piece of gauze under the nose to collect the bloody drainage. This may saturate with blood every 1 to 2 hours for the first few days after surgery. If it saturates completely with blood (blood dripping off of it and totally red) MORE FREQUENTLY THAN EVERY 30 MINUTES then call our office. Avoid nose blowing and try to sneeze with your mouth open.  Sleeping with your head elevated for at least the first night will also help prevent bleeding and reduce congestion. If you have a nosebleed, try spraying a topical decongestant spray (see page 1) into the side of bleeding 2-3 sprays and hold gentle pressure for 10 minutes.  If the bleeding continues after this is done twice call our office.       Care of the Nose  ·   NASAL SALINE IRRIGATION: THIS IS VERY IMPORTANT - Please START IRRIGATIONS THE MORNING AFTER SURGERY. After sinus surgery, we like to have the nose irrigated with a NeilMed Sinus Rinse® bottle (irrigation instructions and saline solution recipe are listed). This will help rinse the blood clots and mucous from the nose.   We recommend doing your nasal irrigations AT LEAST 3 TIMES A DAY UNTIL YOUR FOLLOW UP WITH YOUR SURGEON. IF YOU CAN DO IT BY MAKING AND IRRIGATING TWO BOTTLES BACK TO BACK, THREE TIMES A DAY THAT WOULD BE IDEAL.  · Try not  to manipulate your nose with your fingers     How to Irrigate  · Fill the NeilMed Sinus Rinse® bottle with the room temperature saline solution (see below for recipe). Gently insert the tip into one nostril and squeeze. Keep your head down and blow out through your mouth when you irrigate to prevent water from going back down into your throat. Use about one half of the bottle per nostril. Do this for each nostril at least three times daily until your follow up with your surgeon. The more you can do the irrigation the better. You will likely be irrigating regularly for at least a month or two.    Nasal Irrigation Solution Recipe  · 8 ounces of room temperature distilled water. (If you use tap water, boil it first and let it cool to room temperature.)  · ½ teaspoon of table salt  · ½ teaspoon of baking soda  You can use the NeilMed Sinus Rinse® solution packets if preferred    Follow up  Your appointment for follow up after your surgery should have been scheduled at the time of your surgery. If not, call the office for an appointment scheduled for 1-2 weeks after the date of your surgery.    Call the office or GO TO THE EMERGENCY ROOM if you have:  · Excessive pain, swelling, bleeding or drainage  · Shortness of breath or difficulty breathing  · Persistent nausea and vomiting  · Fever that continues or is higher than 101° F  · Neck stiffness (cannot touch your chin to your chest)  · Confusion  · Worsening headaches that do not respond to pain medication  · Reproducible clear drainage dripping from your nose like a leaky faucet (particularly one sided).  Note:  This may happen and is normal up to 30 minutes following saline irrigations or sprays but if it is reproducible despite stopping saline please contact our office   · Salty or metallic taste (note that you may experience a salty taste that is normal up to 30 minutes following saline use)    Do not hesitate to call if you have any questions or concerns:  Offices  of Otolaryngology - Division of Rhinology  Dr. Mancuso [phone]  Normal office hours are:  8am-4pm Monday - Friday   If there is an after-hours EMERGENCY related to your procedure please call [phone] (ask for the “ENT resident on-call”).

## 2025-05-20 NOTE — INTERVAL H&P NOTE
H&P reviewed. The patient was examined and there are no changes to the H&P.    Current Outpatient Medications   Medication Instructions    albuterol 2.5 mg /3 mL (0.083 %) nebulizer solution Inhale.    albuterol 90 mcg/actuation inhaler Inhale.    azelastine (Astelin) 137 mcg (0.1 %) nasal spray USE 1 SPRAY INTRANASALLY TWICE DAILY    budesonide (Pulmicort) 1 mg/2 mL nebulizer solution     budesonide-formoteroL (Symbicort) 160-4.5 mcg/actuation inhaler 2 puffs, inhalation, 2 times daily RT, Rinse mouth with water after use to reduce aftertaste and incidence of candidiasis. Do not swallow.    budesonide-formoterol (Symbicort) 80-4.5 mcg/actuation inhaler 2 puffs, 2 times daily RT    calcium carbonate 600 mg calcium (1,500 mg) tablet Every 24 hours    cholecalciferol (Vitamin D-3) 125 MCG (5000 UT) capsule Every 24 hours    fluticasone (Flonase) 50 mcg/actuation nasal spray 2 sprays, Daily    fluticasone propionate (Xhance) 93 mcg/actuation aerosol breath activated Spray 1 spray in each nostril twice daily.    losartan (COZAAR) 25 mg, Daily    montelukast (Singulair) 10 mg tablet Every 24 hours    mv-min-FA-vit K-lutein-zeaxant 200 mcg-15 mcg- 5 mg-1 mg capsule Every 24 hours    prednisoLONE acetate (Pred-Forte) 1 % ophthalmic suspension INSTILL 1 DROP 3 TIMES A DAY IN RIGHT EYE FOR 5 DAYS THEN STOP    TURMERIC ORAL Take by mouth.    vitamins A,C,E-zinc-copper (PreserVision AREDS) 2,148 mcg-113 mg-45 mg-17.4mg tablet Take by mouth.

## 2025-05-20 NOTE — ANESTHESIA PROCEDURE NOTES
Airway  Date/Time: 5/20/2025 7:28 AM  Reason: elective    Airway not difficult    Staffing  Performed: resident   Authorized by: Tita Rees MD    Performed by: Arsalan Osullivan MD  Patient location during procedure: OR    Patient Condition  Indications for airway management: anesthesia  Patient position: sniffing  MILS not maintained throughout  Planned trial extubation  Sedation level: deep     Final Airway Details   Preoxygenated: yes  Final airway type: endotracheal airway  Successful airway: ETT  Cuffed: yes   Successful intubation technique: video laryngoscopy  Adjuncts used in placement: intubating stylet  Endotracheal tube insertion site: oral  Blade: Olegario  Blade size: #4  ETT size (mm): 6.5  Cormack-Lehane Classification: grade IIa - partial view of glottis  Placement verified by: chest auscultation and capnometry   Inital cuff pressure (cm H2O): 10  Measured from: teeth  ETT to teeth (cm): 22  Number of attempts at approach: 1

## 2025-05-20 NOTE — ANESTHESIA POSTPROCEDURE EVALUATION
Patient: Mark Meza    Procedure Summary       Date: 05/20/25 Room / Location: [facility] OR 04 / Virtual [facility] [place] OR    Anesthesia Start: 0717 Anesthesia Stop: 1015    Procedures:       inferior turbinate submucous resections with IGS. (Bilateral)      MAXILLARY SINUS TISSUE EXCISION (Bilateral)      NASAL TURBINATE EXCISION (Bilateral)      IMAGE GUIDANCE Diagnosis:       Chronic pansinusitis      Hypertrophy of nasal turbinates      Nasal drainage      Polyp of nasal sinus      (Chronic pansinusitis [J32.4])      (Hypertrophy of nasal turbinates [J34.3])      (Nasal drainage [J34.89])      (Polyp of nasal sinus [J33.8])    Surgeons: Braeden Mancuso MD Responsible Provider: Tita Rees MD    Anesthesia Type: general ASA Status: 3            Anesthesia Type: general    Vitals Value Taken Time   /76 05/20/25 10:15   Temp 36.2 05/20/25 10:15   Pulse 69 05/20/25 10:15   Resp 16 05/20/25 10:15   SpO2 98 05/20/25 10:15       Anesthesia Post Evaluation    Patient location during evaluation: PACU  Patient participation: complete - patient participated  Level of consciousness: awake and alert  Pain management: adequate  Airway patency: patent  Cardiovascular status: acceptable and hemodynamically stable  Respiratory status: acceptable and room air  Hydration status: acceptable  Postoperative Nausea and Vomiting: none        There were no known notable events for this encounter.

## 2025-05-20 NOTE — NURSING NOTE
This nurse went over discharge instructions with family.  Patient and family verbalized education.  Nurse made a mustache dressing for the patient and explained how to make them at home.  Patient given supplies to make dressing

## 2025-05-20 NOTE — ANESTHESIA PREPROCEDURE EVALUATION
Patient: Mark Meza    Procedure Information       Date/Time: 05/20/25 0645    Procedures:       inferior turbinate submucous resections with IGS. (Bilateral)      MAXILLARY SINUS TISSUE EXCISION (Bilateral)      NASAL TURBINATE EXCISION (Bilateral)      IMAGE GUIDANCE    Location: [facility] OR 04 / Virtual [facility] OR    Surgeons: Braeden Mancuso MD            Relevant Problems   Anesthesia (within normal limits)      Cardiac   (+) HTN (hypertension)      Pulmonary   (+) Mild asthma (HHS-HCC)      HEENT   (+) Chronic pansinusitis   (+) Polyp of nasal sinus       Clinical information reviewed:   Tobacco  Allergies  Meds   Med Hx  Surg Hx   Fam Hx  Soc Hx        NPO Detail:  NPO/Void Status  Carbohydrate Drink Given Prior to Surgery? : N  Date of Last Liquid: 05/20/25  Time of Last Liquid: 0000  Date of Last Solid: 05/20/25  Time of Last Solid: 0000  Last Intake Type: Food  Time of Last Void: 0500         Physical Exam    Airway  Mallampati: III  TM distance: >3 FB  Neck ROM: full  Mouth opening: 3 or more finger widths     Cardiovascular   Rhythm: regular  Rate: normal     Dental    Pulmonary Breath sounds clear to auscultation     Abdominal            Anesthesia Plan    History of general anesthesia?: yes  History of complications of general anesthesia?: no    ASA 2     general     The patient is not a current smoker.  Patient did not smoke on day of procedure.    intravenous induction   Postoperative pain plan includes opioids.  Anesthetic plan and risks discussed with patient.  Use of blood products discussed with patient who consented to blood products.    Plan discussed with resident.

## 2025-05-20 NOTE — OP NOTE
Operative Note     Date: 2025  OR Location: [facility] OR    Name: Mark Meza, : 1950, Age: 75 y.o., MRN: [id number], Sex: male    Diagnosis  Pre-op Diagnosis      * Chronic pansinusitis [J32.4]     * Hypertrophy of nasal turbinates [J34.3]     * Nasal drainage [J34.89]     * Polyp of nasal sinus [J33.8] Post-op Diagnosis     * Chronic pansinusitis [J32.4]     * Hypertrophy of nasal turbinates [J34.3]     * Nasal drainage [J34.89]     * Polyp of nasal sinus [J33.8]       Operation/Procedure(s):     1. Bilateral endoscopic total ethmoidectomies with sphenoidotomies with removal of tissue from sinuses  2. Bilateral endoscopic frontal sinusotomies with frontal sinus explorations  3. Bilateral endoscopic maxillary antrostomies with removal of tissue from sinuses  4. Bilateral inferior turbinate submucous resection  5. Image guidance navigation - extradural    Surgeon: Braeden Mancuso MD FACS    Assistant(s): None    EBL: 200 ml    Anesthesia: General and local     Operative Findings:  1. Chronic inflammation through all sinuses, with nasal sinus polyps and removal of middle turbinates bilaterally  2. Absorbable hemostatic material in the ethmoids  3. Sherwood Splint sutured to the septum bilaterally   4. Propel Contour stents placed in the frontal sinusotomies bilaterally    Operative Indications:   Mark Meza is a 75 y.o.  male  with a history of chronic rhinosinusitis - Bilateral pansinusitis, inferior turbinate hypertrophy, nasal sinus polyps. The patient was treated with maximal medical therapy and a post-treatment CT scan showed persistent disease. Therefore, the risks, benefits, and alternatives of the above procedures were discussed and the patient agreed to proceed. Please see the ambulatory EMR for full documentation and detail of this discussion.    Operative/Procedure Narrative:   The patient was brought into the operating room and laid in a supine position on the operating room  table. A surgical huddle was conducted to verify the patient and the procedure to be performed. General anesthesia was induced and the patient's airway was secured with an ET tube. A time out was called. The nasal cavities were sprayed with 0.5% Afrin. The right and left nasal cavities were then injected with lidocaine 1% with 1:100,000 epinephrine. Next, image guidance was attached and registered. The image guidance was used through the procedure for identification of critical landmarks. Once the image guidance was calibrated, the nasal cavities were examined with a 0 degree endoscope.    The patient was noted to have enlarged inferior turbinates on the right and left, a mild septal deflection to the right.   Using a zero degree endoscope for visualization and a [place] elevator on the left, the middle turbinate was medialized. The double ball probe and backbiting forceps were used to perform a retrograde uncinectomy. The probe was then used to cannulate the maxillary os and a large maxillary antrostomy was made with a straight through cutting forceps. The edges were cleaned up with the microdebrider. The maxillary sinus was entered and suctioned and tissue was removed. Attention was then turned to the ethmoid air cells. The ethmoid bulla and basal lamella were resected with cutting instruments and the microdebrider. The location of the skull base and lamina papyracea was periodically confirmed with the surgical navigation. Posteriorly, the superior turbinate was identified and the inferior third was excised using the microdebrider. The sphenoid ostium was cannulated using the probe. The mushroom punch was used to complete a sphenoidotomy. The sphenoid sinusotomy was further dissected using the cutting instruments and tissue was removed.  We then proceeded in a posterior to anterior fashion dissecting remnant ethmoid air cells off the skull base and orbit to complete a total ethmoidectomy. We then switched to a 70  degree scope and visualized the frontal outflow tract. Using a frontal sinus probe, the frontal os was identified. A frontal sinusotomy was created using a Hosemann punch, giraffe forceps and a microdebrider. The middle turbinate was removed as it was largely involved with disease and destabilized.     The left nasal cavity was then examined with the 0 degree endoscope and the middle turbinate was medialized with a freer.  The double ball probe and backbiting forceps were used to perform a retrograde uncinectomy. The probe was then used to cannulate the maxillary os and a large maxillary antrostomy was made with a straight through cutting forceps. The edges were cleaned up with the microdebrider. The maxillary sinus was entered and suctioned and tissue was removed. Attention was then turned to the ethmoid air cells. The ethmoid bulla and basal lamella were resected with cutting instruments and the microdebrider. The location of the skull base and lamina papyracea was periodically confirmed with the surgical navigation. Posteriorly, the superior turbinate was identified and the inferior third was excised using the microdebrider. The sphenoid ostium was cannulated using the probe. The mushroom punch was used to complete a sphenoidotomy. The sphenoid sinusotomy was further dissected using the cutting instruments and tissue was removed.  We then proceeded in a posterior to anterior fashion dissecting remnant ethmoid air cells off the skull base and orbit to complete a total ethmoidectomy. We then switched to a 70 degree scope and visualized the frontal outflow tract. Using a frontal sinus probe, the frontal os was identified. A frontal sinusotomy was created using a Hosemann punch, giraffe forceps and a microdebrider. The middle turbinate was removed as it was largely involved with disease and destabilized.     The right and left inferior turbinates were then infiltrated with 1% lidocaine with epinephrine. A stab  incision was then made in the head of the left inferior turbinate and the right inferior turbinate. A Sol[place]ad elevator was used to elevate the mucous membrane off the inferior conchal bone on both sides and the microdebrider inferior turbinate blade attachment was then used to perform a submucous resection of tissue in the right and then the left inferior turbinates. The turbinates were then outfractured laterally with a Boies elevator. The nasopharynx was then suctioned and the nose was copiously irrigated with normal saline. It was inspected for any bleeding and none was noted. Absorbable hemostatic material was placed in the bilateral ethmoid cavities. This completed the surgical procedure. An OG tube was passed to suction out gastric contents. The patient was turned over to the anesthesia team for awakening and extubation. They were transferred to the PACU in stable condition.     Implants/Packing: Absorbable hemostatic material      Specimens:   Sinonasal contents to pathology     Complications:   None    Attestation: I, Braeden Mancuso, was present for and completed all key portions of the procedure and there were no qualified residents available to assist me.      Braeden Mancuso  Phone Number: [phone]

## 2025-05-23 ENCOUNTER — APPOINTMENT (OUTPATIENT)
Dept: OTOLARYNGOLOGY | Facility: CLINIC | Age: 75
End: 2025-05-23
Payer: COMMERCIAL

## 2025-05-27 NOTE — PATIENT INSTRUCTIONS
PATIENT INSTRUCTIONS ARE COMPLETE and READY TO PRINT    Please observe the following post-op precautions for 2 weeks from the date of your surgery (until Tuesday, Cate 3):  Please avoid blowing your nose. Please do not stifle any sneezes. If you must sneeze, try to sneeze through an open mouth. Please do not stick anything in your nose other than any medicine or irrigations we recommend. Please do not insert a Q-tip or finger in the nose because this will cause further trauma. Please refrain from any activities that will increase your heart rate or blood pressure. Please do not exercise and avoid all strenuous activities. Please refrain from lifting objects greater than 10 pounds. Try to keep your head above your heart as much as possible. Please avoid all traveling outside your local area during this 2-week precaution period. After this period, GRADUALLY ease back into normal activities.    Start saline and budesonide irrigations as noted below:  In the morning, do a saline irrigation followed by a budesonide irrigation.  In the middle of the day, do 2 saline irrigations back-to-back.  In the evening, do a saline irrigation followed by a budesonide irrigation.  If the budesonide is not affordable, please call our office at [phone] or send us a [employer] message. We can prescribe it to a compounding pharmacy to reduce the cost significantly.  To prepare a budesonide irrigation: Always add 1 salt/baking soda packet to 8 ounces of distilled water, or use the saline recipe below. Shake to dissolve. After that, add 1 budesonide capsule or vial to the saline solution. If you get budesonide as a VIAL, swirl the vial gently in the saline solution to avoid deactivating the budesonide. If you get budesonide in PILL form, shake to dissolve. Use half of the mixture (4 ounces or 120 mL) in each nostril.  If you decide to microwave or otherwise heat up your nasal irrigation solution, be sure to only heat up the saline  solution. Be sure to add the budesonide AFTER you have warmed the solution. Otherwise the heating process can deactivate the medicine. Follow all other instructions for the medicated saline irrigation. Heating up the irrigation solution is entirely optional if it improves your comfort.  Make sure that the budesonide irrigation is the last irrigation you do for at least several hours and the last irrigation before going to sleep. This allows the budesonide to coat your nasal and sinus cavities.     Saline recipe:  Use distilled water. Prepare 8 ounces (or 240 mL) of distilled water with 1/2 teaspoon of baking soda and 1/2 teaspoon of table salt. Shake bottle to dissolve. Avoid using tap water. If you must use tap water, be sure to boil it, then let it cool down.    Follow up:  Please follow up with me on Thursday, June 12 at 11:00 AM for your second postoperative visit. If you have questions, feel free to contact my office at [phone] or send us a [employer] message.    Scribe Attestation  By signing my name below, IMark Scribe, attest that this documentation has been prepared under the direction and in the presence of Braeden Mancuso MD.

## 2025-05-27 NOTE — PROGRESS NOTES
Sinus & Skull Base Surgery    HPI   Mark Meza is a 75 y.o. male h/o chronic pansinusitis with NP, ITH, nasal drainage s/p bilateral ESS (total), bilateral ITR, removal of bilateral middle turbinates on 5/20/2025.  5/29/25 - Patient presents for his 1st post op visit. He denies acute concerns. He reports expected postoperative congestion and edema. The patient has been doing saline irrigations 4x daily, which have been productive of crusts and dried/old blood. His sense of taste and smell is improved.  He denies excessive bleeding, constant clear fluid from nose, severe headaches, double vision, or fever.    Operative Report:  Date of Service: 5/20/2025  Location: [facility] OR    Post-op Diagnoses:  * Chronic pansinusitis [J32.4]  * Hypertrophy of nasal turbinates [J34.3]  * Nasal drainage [J34.89]  * Polyp of nasal sinus [J33.8]    Operation/Procedures:  1. Bilateral endoscopic total ethmoidectomies with sphenoidotomies with removal of tissue from sinuses  2. Bilateral endoscopic frontal sinusotomies with frontal sinus explorations  3. Bilateral endoscopic maxillary antrostomies with removal of tissue from sinuses  4. Bilateral inferior turbinate submucous resection  5. Image guidance navigation - extradural     Operative Findings:  1. Chronic inflammation through all sinuses, with nasal sinus polyps and removal of middle turbinates bilaterally  2. Absorbable hemostatic material in the ethmoids  3. Sherwood Splint sutured to the septum bilaterally   4. Propel Contour stents placed in the frontal sinusotomies bilaterally    Surgeon: Braeden Mancuso MD FACS  Assistant: None  EBL: 200 ml  Anesthesia: General and local    Assessment   Mark Meza is a 75 y.o. male h/o chronic pansinusitis with NP, ITH, nasal drainage s/p bilateral ESS (total), bilateral ITR, removal of bilateral middle turbinates on 5/20/2025.  5/29/25 - 1st post op. Expected post op swelling today. Debridement performed as  noted. Rx budesonide irrig BID. Continue saline irrig.    Plan   Nasal Endoscopy with debridement. Findings: expected post op swelling.  Patient was prescribed budesonide irrigations twice daily. Prescription sent to the patient's pharmacy. Patient was instructed to continue saline irrigations twice daily before budesonide irrigations. He was also instructed to do 2 saline irrigations back-to-back in the middle of the day.  Reassurance provided to patient. The nose is healing and edema at this point is expected.  Follow up as scheduled for repeat evaluation.    Braeden Mancuso MD [facility]  Division of Rhinology, Sinus, and Skull Base Surgery       Exam   General: This is a healthy appearing male who appears his stated age. The patient is alert and appropriately verbally conversant without hoarseness.  Face: The face was inspected and no cutaneous masses or lesions were visualized. There was no erythema or edema noted. Facial movement was symmetric without weakness. No skin lesions were detected.  Eyes: Extra-ocular muscle function was intact. No nystagmus was observed. Pupils were equal.    Nose: Examination of the nose revealed the nasal dorsum to be midline. Intranasal exam reveals the septum is healthy without perforation. The inferior turbinates were expectedly edematous. Crusts and dried secretions noted on anterior rhinoscopy. See below procedure note as applicable for further exam.    Procedure Note:  Procedure: Nasal endoscopy with debridement - bilateral  Indication: Chronic rhinosinusitis, post operative from sinus surgery  Informed consent obtained: risks, benefits, alternatives, and expectations discussed with patient and the patient wishes to proceed.    Findings: After anesthesia and decongestion with topical lidocaine and Afrin spray, the nasal cavities were examined and debrided with a zero and/or 30-degree endoscope. Large crusts were taken down from the anterior and posterior nasal chambers with a  straight Blakesley forceps. Crusts were debrided and removed from the middle meatus and ethmoid cavity on the right and left. Sphenoidotomies are clear. The maxillary and ethmoid sinuses are widely patent. The frontal recesses were clear. No pus or polyps were noted. There is no CSF leak. Things are healing well. The patient tolerated the procedure well and there were no complications.       Scribe Attestation  By signing my name below, I, Neha Iniguez, attest that this documentation has been prepared under the direction and in the presence of Braeden Mancuso MD.

## 2025-05-29 ENCOUNTER — APPOINTMENT (OUTPATIENT)
Dept: OTOLARYNGOLOGY | Facility: CLINIC | Age: 75
End: 2025-05-29
Payer: MEDICARE

## 2025-05-29 DIAGNOSIS — J45.909 MILD ASTHMA, UNSPECIFIED WHETHER COMPLICATED, UNSPECIFIED WHETHER PERSISTENT (HHS-HCC): ICD-10-CM

## 2025-05-29 DIAGNOSIS — J33.8 POLYP OF NASAL SINUS: ICD-10-CM

## 2025-05-29 DIAGNOSIS — J34.89 NASAL CRUSTING: ICD-10-CM

## 2025-05-29 DIAGNOSIS — J32.4 CHRONIC PANSINUSITIS: Primary | ICD-10-CM

## 2025-05-29 PROCEDURE — 1160F RVW MEDS BY RX/DR IN RCRD: CPT | Performed by: OTOLARYNGOLOGY

## 2025-05-29 PROCEDURE — 99024 POSTOP FOLLOW-UP VISIT: CPT | Performed by: OTOLARYNGOLOGY

## 2025-05-29 PROCEDURE — 1036F TOBACCO NON-USER: CPT | Performed by: OTOLARYNGOLOGY

## 2025-05-29 PROCEDURE — 1159F MED LIST DOCD IN RCRD: CPT | Performed by: OTOLARYNGOLOGY

## 2025-05-29 PROCEDURE — 31237 NSL/SINS NDSC SURG BX POLYPC: CPT | Performed by: OTOLARYNGOLOGY

## 2025-05-29 RX ORDER — BUDESONIDE 0.5 MG/2ML
0.5 INHALANT ORAL 2 TIMES DAILY
Qty: 120 ML | Refills: 3 | Status: SHIPPED | OUTPATIENT
Start: 2025-05-29 | End: 2025-09-26

## 2025-05-30 DIAGNOSIS — J32.8 OTHER CHRONIC SINUSITIS: ICD-10-CM

## 2025-05-30 DIAGNOSIS — J32.4 CHRONIC PANSINUSITIS: Primary | ICD-10-CM

## 2025-05-30 DIAGNOSIS — J33.8 POLYP OF NASAL SINUS: ICD-10-CM

## 2025-05-30 RX ORDER — BUDESONIDE
0.6 POWDER (GRAM) MISCELLANEOUS 2 TIMES DAILY
Qty: 180 G | Refills: 0 | Status: SHIPPED | OUTPATIENT
Start: 2025-05-30

## 2025-05-30 NOTE — TELEPHONE ENCOUNTER
Nasal irrigations ordered. Order placed to [facility] for budesonide twice a day per nasal irrigation x 90 days with 0 refills. Patient notified compounding pharmacy will call patient for further instruction and to obtain additional information. Patient instructed to call with any further questions.

## 2025-06-02 ENCOUNTER — OFFICE VISIT (OUTPATIENT)
Dept: PULMONOLOGY | Facility: HOSPITAL | Age: 75
End: 2025-06-02
Payer: MEDICARE

## 2025-06-02 VITALS
HEIGHT: 69 IN | WEIGHT: 197 LBS | RESPIRATION RATE: 16 BRPM | BODY MASS INDEX: 29.18 KG/M2 | SYSTOLIC BLOOD PRESSURE: 135 MMHG | DIASTOLIC BLOOD PRESSURE: 77 MMHG | OXYGEN SATURATION: 97 % | HEART RATE: 67 BPM

## 2025-06-02 DIAGNOSIS — J33.8 POLYP OF NASAL SINUS: ICD-10-CM

## 2025-06-02 DIAGNOSIS — J45.40 MODERATE PERSISTENT ASTHMA WITHOUT COMPLICATION (HHS-HCC): Primary | ICD-10-CM

## 2025-06-02 DIAGNOSIS — J30.9 CHRONIC ALLERGIC RHINITIS: ICD-10-CM

## 2025-06-02 DIAGNOSIS — Z77.090 HISTORY OF ASBESTOS EXPOSURE: ICD-10-CM

## 2025-06-02 PROCEDURE — 3075F SYST BP GE 130 - 139MM HG: CPT | Performed by: INTERNAL MEDICINE

## 2025-06-02 PROCEDURE — 3078F DIAST BP <80 MM HG: CPT | Performed by: INTERNAL MEDICINE

## 2025-06-02 PROCEDURE — 99214 OFFICE O/P EST MOD 30 MIN: CPT | Performed by: INTERNAL MEDICINE

## 2025-06-02 PROCEDURE — 3008F BODY MASS INDEX DOCD: CPT | Performed by: INTERNAL MEDICINE

## 2025-06-02 PROCEDURE — 1159F MED LIST DOCD IN RCRD: CPT | Performed by: INTERNAL MEDICINE

## 2025-06-02 PROCEDURE — 1036F TOBACCO NON-USER: CPT | Performed by: INTERNAL MEDICINE

## 2025-06-02 RX ORDER — BUDESONIDE AND FORMOTEROL FUMARATE DIHYDRATE 160; 4.5 UG/1; UG/1
2 AEROSOL RESPIRATORY (INHALATION)
Qty: 10.2 G | Refills: 11 | Status: SHIPPED | OUTPATIENT
Start: 2025-06-02 | End: 2025-06-05 | Stop reason: SDUPTHER

## 2025-06-02 RX ORDER — MONTELUKAST SODIUM 10 MG/1
10 TABLET ORAL NIGHTLY
Qty: 90 TABLET | Refills: 3 | Status: SHIPPED | OUTPATIENT
Start: 2025-06-02 | End: 2026-06-02

## 2025-06-02 ASSESSMENT — ENCOUNTER SYMPTOMS
FATIGUE: 0
RHINORRHEA: 0
SHORTNESS OF BREATH: 0
CHILLS: 0
COUGH: 0
UNEXPECTED WEIGHT CHANGE: 0
FEVER: 0
WHEEZING: 0

## 2025-06-02 NOTE — PROGRESS NOTES
Subjective   Patient ID: Mark Meza is a 75 y.o. male who presents for follow up asthma.     Asthma  There is no cough (denies hemoptysis.), shortness of breath or wheezing. Pertinent negatives include no chest pain, fever, postnasal drip or rhinorrhea. His past medical history is significant for asthma.   : Patient has PMH of asthma, allergic rhinitis, and nasal polyps. He was referred for asthma management. He is having surgery to remove polyps the first week of January. He states that his first diagnosis with asthma was about ten or so years ago. He gets short of breath on more than ordinary activity. He states that his breathing has been worse since July and the only thing that helps is prednisone. He has a daily cough that is mostly dry but is productive at times. He is not on any maintenance inhaler and using albuterol along with nebulizers several times per day. He denies any fever, chills, chest pain, leg swelling, or hemoptysis. He has never smoked but was exposed to second hand smoke growing up. He also reports that he was exposed to asbestos. He used to work in a machine shop.      He reports he just had surgery for nasal polyps by Dr. Valdemar WOLF in [place]. He reports he used to have coughing spells for hours and now he is better. He notes significant improvement with Trelegy, montelukast, flonase and xyzal. He was also given prednisone 5 mg daily which he is off since Dec 2021. His PFTs were normal but showed BDR and normal 6MWT. His RAST showed + for dust mites but Eos were 100 done on prednisone. He reports he was doing better but his nasal polyps are returned. He is having more nasal congestion but asthma has not flared up. He takes occasional Ibuprofen for pain.     He reports he is not having any issues and denies shortness of breath, cough or wheezing. Patient was started on Dupixent with ENT in December 2022 and had multiple adverse effects. Patient developed muscle aches, Achilles  "sprain, had a fall that tore his Achillis. Patient fainted and then in April 2024 developed pneumonia. After the pneumonia patient had an ongoing chronic cough that was terrible and PCP gave him a kenalog shot and he has been great since. Patient states he was retested for allergies and he did not flag enough to need any injections so the ENT stopped those. Patient states his breathing is pretty good. Patient has an occasional clearing his throat from possible drainage but no actual cough. Patient has not needed rescue inhaler or the nebulizer. Patient has no other concerns for today. \"I am so happy not be coughing anymore. I have coughed so much in my life.\"     Today he is here for follow up. He states that his breathing has been good. He was last seen in July 2024 and since the symptoms were better he was advised to stop Symbicort. He noted worsening of his breathing, coughing and wheezing on stopping Symbicort. He was restated on Symbicort in Feb 2025 & has felt his SOB, coughing and wheezing is well controlled. He is getting Breyna inhaler instead now per insurance. He has not needed albuterol in several years. He just underwent extensive sinus surgery with Dr. Mancuso on May 20, 25 and has been feeling good. He will have a follow up appointment with him next week and until then he is off the nasal sprays but is taking montelukast. He is not taking xyzal currently but would discuss with Dr. Mancuso.    Review of Systems   Constitutional:  Negative for chills, fatigue, fever and unexpected weight change.   HENT:  Positive for congestion. Negative for postnasal drip and rhinorrhea.    Respiratory:  Negative for cough (denies hemoptysis.), shortness of breath and wheezing.    Cardiovascular:  Negative for chest pain and leg swelling.   All other systems reviewed and are negative.      Objective   Physical Exam  Vitals reviewed.   Constitutional:       Appearance: Normal appearance.   HENT:      Head: Normocephalic. "   Cardiovascular:      Rate and Rhythm: Normal rate and regular rhythm.   Pulmonary:      Effort: Pulmonary effort is normal.      Breath sounds: Normal breath sounds.   Skin:     General: Skin is warm and dry.   Neurological:      Mental Status: He is alert.     Hx of ENT surgery: May 20, 25  Operation/Procedure(s):      1. Bilateral endoscopic total ethmoidectomies with sphenoidotomies with removal of tissue from sinuses  2. Bilateral endoscopic frontal sinusotomies with frontal sinus explorations  3. Bilateral endoscopic maxillary antrostomies with removal of tissue from sinuses  4. Bilateral inferior turbinate submucous resection  5. Image guidance navigation - extradural      Assessment/Plan   1. Moderate Persistent Bronchial asthma  2. Chronic Allergic rhinitis  3. Asbestos exposure   4. Nasal polyps with chronic rhinosinusitis  5. Dust mites allergy     Plan:     -PFTs with significant BDR suggesting asthma but normal FEV1. Continue Breyna 2 puffs BID and albuterol prn and Duonebs prn. He stopped prednisone 5 mg daily in Dec 2021 and did not have worsening of symptoms. He noted worsened symptoms and mild asthma exacerbations needing Prednisone in Spring 2023 and Feb 2025. He got Kenalog shot in May 2023 with PCP. Discussed that given better control of his Asthma symptoms and exacerbations continue with ICS/LABA long term on regular basis. Sent Breyna to his Pharmacy.     -, RAST with mild + for dust mites. Discussed avoidance of exposure to dust. Continue Montelukast and Flonase. He has not been taking xyzal and has noted recurrence of polyps despite polypectomy. Avoid NSAIDs. He did take Dupixent x 3 months in 2022 with ENT but pt states it did not help him and after Dec 2022 he had reaction with blurry vision and start of pneumonia, achilles tear and syncope. Discussed to take Montelukast, Flonase and Azelastine/Xyzal for long term control. He will discuss this with Dr. Mancuso.      -He has a known  asbestos exposure in 1970, I reviewed CXR from August and lungs were clear. No clinical indication at present to repeat CXR per guidelines.      Overall his asthma is controlled especially when takes Symbicort/Breyna regularly. Advised to follow up yearly or sooner if he has any worsening cough or shortness of breath. Educated and counseled on need for regular rx for allergic rhinitis and asthma.    patient

## 2025-06-02 NOTE — PATIENT INSTRUCTIONS
Continue to take Breyna inhaler as discussed.   Continue albuterol as needed for shortness of breath.   Continue Montelukast, Flonase, and Azelastine as discussed after your ENT appointment.  Call with any questions or concerns.   Follow up with Dr. Lebron in one year.

## 2025-06-04 LAB
LABORATORY COMMENT REPORT: NORMAL
PATH REPORT.FINAL DX SPEC: NORMAL
PATH REPORT.GROSS SPEC: NORMAL
PATH REPORT.RELEVANT HX SPEC: NORMAL
PATH REPORT.TOTAL CANCER: NORMAL

## 2025-06-05 DIAGNOSIS — J45.40 MODERATE PERSISTENT ASTHMA WITHOUT COMPLICATION (HHS-HCC): ICD-10-CM

## 2025-06-05 RX ORDER — BUDESONIDE AND FORMOTEROL FUMARATE DIHYDRATE 160; 4.5 UG/1; UG/1
2 AEROSOL RESPIRATORY (INHALATION)
Qty: 10.2 G | Refills: 11 | Status: SHIPPED | OUTPATIENT
Start: 2025-06-05 | End: 2026-06-05

## 2025-06-11 NOTE — PATIENT INSTRUCTIONS
Name_______________________________________Printed:____________________  Date and time of surgery___8/9/22  0900_____________________Arrival Time:__0730______________   1. The instructions given regarding when and if a patient needs to stop oral intake prior to surgery varies. Follow the specific instructions you were given                  __x_Nothing to eat or to drink after Midnight the night before.                   ____Carbo loading or ERAS instructions will be given to select patients-if you have been given those instructions -please do the following                           The evening before your surgery after dinner before midnight drink 40 ounces of gatorade. If you are diabetic use sugar free. The morning of surgery drink 40 ounces of water. This needs to be finished 3 hours prior to your surgery start time. 2. Take the following pills with a small sip of water on the morning of surgery: none                  Do not take blood pressure medications ending in pril or sartan the cuong prior to surgery or the morning of surgery_   3. Aspirin, Ibuprofen, Advil, Naproxen, Vitamin E and other Anti-inflammatory products and supplements should be stopped for 5 -7days before surgery or as directed by your physician. 4. Check with your Doctor regarding stopping Plavix, Coumadin,Eliquis, Lovenox,Effient,Pradaxa,Xarelto, Fragmin or other blood thinners and follow their instructions. 5. Do not smoke, and do not drink any alcoholic beverages 24 hours prior to surgery. This includes NA Beer. Refrain from the usage of any recreational drugs. 6. You may brush your teeth and gargle the morning of surgery. DO NOT SWALLOW WATER   7. You MUST make arrangements for a responsible adult to stay on site while you are here and take you home after your surgery. You will not be allowed to leave alone or drive yourself home. It is strongly suggested someone stay with you the first 24 hrs.  Your surgery will be cancelled if you PATIENT INSTRUCTIONS ARE COMPLETE and READY TO PRINT    Continue saline and budesonide irrigations and start Xhance as noted below:  In the morning, do a saline irrigation followed by a saline + budesonide irrigation. Use 1 puff of Xhance in each nostril after.  In the evening, do a saline irrigation followed by a saline + budesonide irrigation. Use 1 puff of Xhance in each nostril after.  To prepare a budesonide irrigation: Always add 1 salt/baking soda packet to 8 ounces of distilled water, or use the saline recipe below. Shake to dissolve. After that, add 1 budesonide capsule to the saline solution. Use half of the mixture (4 ounces or 120 mL) in each nostril.  If you decide to microwave or otherwise heat up your nasal irrigation solution, be sure to only heat up the saline solution. Be sure to add the budesonide AFTER you have warmed the solution. Otherwise the heating process can deactivate the medicine. Follow all other instructions for the medicated saline irrigation. Heating up the irrigation solution is entirely optional if it improves your comfort.  Make sure that the budesonide irrigation is the last irrigation you do for at least several hours and the last irrigation before going to sleep. This allows the budesonide to coat your nasal and sinus cavities.    Saline recipe:  Use distilled water. Prepare 8 ounces (or 240 mL) of distilled water with 1/2 teaspoon of baking soda and 1/2 teaspoon of table salt. Shake bottle to dissolve. Avoid using tap water. If you must use tap water, be sure to boil it, then let it cool down.    Xhance:  Please start using Xhance. Do 1 puff in each nostril twice a day after the saline + budesonide irrigation.  If Xhance is approved by your insurance, [facility] mail-order pharmacy will contact you.  To watch an Xhance demonstration video, please visit [email or web address]  Irrigate before using Xhance. Otherwise, you will wash Xhance out of your nose with the irrigation.    Follow  up:  Please follow up with me in 6 weeks. If you have questions, feel free to contact my office at [phone] or send us a [employer] message.    Scribe Attestation  By signing my name below, I, Neha Iniguez, attest that this documentation has been prepared under the direction and in the presence of Braeden Mancuso MD.   20.During flu season no children under the age of 15 are permitted in the hospital for the safety of all patients. 21. If you take a long acting insulin in the evening only  take half of your usual  dose the night  before your procedure              22. If you use a c-pap please bring DOS if staying overnight,             23.For your convenience [facility] has a pharmacy on site to fill your prescriptions. 24. If you use oxygen and have a portable tank please bring it  with you the DOS             25. Bring a complete list of all your medications with name and dose include any supplements. 26. Other__________________________________________   *Please call pre admission testing if you any further questions   [facility] [place]    [facility]  [phone]   [address] [place] [address]       VISITOR POLICY(subject to change)    Current policy is 2 visitors per patient. No children. A mask is required. Visiting hours are 8a-8p. Overnight visitors will be at the discretion of the nurse. All above information reviewed with patient in person or by phone. Patient verbalizes understanding. All questions and concerns addressed.                                                                                                  Patient/Rep___pateint_________________                                                                                                                                    PRE OP INSTRUCTIONS

## 2025-06-11 NOTE — PROGRESS NOTES
Sinus & Skull Base Surgery    HPI   Mark Meza is a 75 y.o. male h/o chronic pansinusitis with NP possible REAH, ITH, nasal drainage s/p bilateral ESS (total), bilateral ITR, removal of bilateral middle turbinates on 5/20/2025.  5/29/25 - Patient presents for his 1st post op visit. He denies acute concerns. He reports expected postoperative congestion and edema. The patient has been doing saline irrigations 4x daily, which have been productive of crusts and dried/old blood. His sense of taste and smell is improved.  He denies excessive bleeding, constant clear fluid from nose, severe headaches, double vision, or fever.  6/12/25 - Patient presents for his 2nd post op visit. He has a mild post-nasal drainage that makes him cough. He is irrigating 3x daily, twice with budesonide. Over the last few days, he has really started to notice an improvement in his sense of smell and taste.  He denies excessive bleeding, constant clear fluid from nose, severe headaches, double vision, or fever.    Operative Report:  Date of Service: 5/20/2025  Location: [facility]    Operation/Procedures:  1. Bilateral endoscopic total ethmoidectomies with sphenoidotomies with removal of tissue from sinuses  2. Bilateral endoscopic frontal sinusotomies with frontal sinus explorations  3. Bilateral endoscopic maxillary antrostomies with removal of tissue from sinuses  4. Bilateral inferior turbinate submucous resection  5. Image guidance navigation - extradural     Operative Findings:  1. Chronic inflammation through all sinuses, with nasal sinus polyps and removal of middle turbinates bilaterally  2. Absorbable hemostatic material in the ethmoids  3. Sherwood Splint sutured to the septum bilaterally   4. Propel Contour stents placed in the frontal sinusotomies bilaterally    Assessment   Mark Meza is a 75 y.o. male h/o chronic pansinusitis with NP possible REAH, ITH, nasal drainage s/p bilateral ESS (total),  bilateral ITR, removal of bilateral middle turbinates on 5/20/2025.  5/29/25 - 1st post op. Expected post op swelling today. Debridement performed as noted. Rx budesonide irrig BID. Continue saline irrig.  6/12/25 - 2nd post op. Expected edema but all sinuses patent today. Crusting continues to be debrided as expected. Continue saline and budesonide irrig BID. Rx Xhance.    Plan   Nasal Endoscopy with debridement. Findings: expected post op swelling.  Patient was instructed to start Xhance. Rx given.  Continue budesonide irrigations twice daily. Continue saline irrigations twice daily before budesonide irrigations. Reassurance provided to patient. The nose is healing and edema at this point is expected.  Follow up with me in 6 weeks.    Braeden Mancuso MD [facility]  Division of Rhinology, Sinus, and Skull Base Surgery       Exam   General: This is a healthy appearing male who appears his stated age. The patient is alert and appropriately verbally conversant without hoarseness.  Face: The face was inspected and no cutaneous masses or lesions were visualized. There was no erythema or edema noted. Facial movement was symmetric without weakness. No skin lesions were detected.  Eyes: Extra-ocular muscle function was intact. No nystagmus was observed. Pupils were equal.    Nose: Examination of the nose revealed the nasal dorsum to be midline. Intranasal exam reveals the septum is healthy without perforation. The inferior turbinates were expectedly edematous. Crusts and dried secretions noted on anterior rhinoscopy. See below procedure note as applicable for further exam.    Procedure Note:  Procedure: Nasal endoscopy with debridement - bilateral  Indication: Chronic rhinosinusitis, post operative from sinus surgery  Informed consent obtained: risks, benefits, alternatives, and expectations discussed with patient and the patient wishes to proceed.    Findings: After anesthesia and decongestion with topical lidocaine and Afrin  spray, the nasal cavities were examined and debrided with a zero and/or 30-degree endoscope. Large crusts were taken down from the anterior and posterior nasal chambers with a straight Blakesley forceps or ortiz. Crusts were debrided and removed from the middle meatus and ethmoid cavity on the right and left. Sphenoidotomies are clear. The maxillary and ethmoid sinuses are widely patent. The frontal recesses were clear. No pus or polyps were noted. There is no CSF leak. Things are healing well. The patient tolerated the procedure well and there were no complications.       Scribe Attestation  By signing my name below, I, Neha Iniguez, attest that this documentation has been prepared under the direction and in the presence of Braeden Mancuso MD.

## 2025-06-12 ENCOUNTER — APPOINTMENT (OUTPATIENT)
Dept: OTOLARYNGOLOGY | Facility: CLINIC | Age: 75
End: 2025-06-12
Payer: MEDICARE

## 2025-06-12 VITALS — BODY MASS INDEX: 29.18 KG/M2 | HEIGHT: 69 IN | WEIGHT: 197 LBS

## 2025-06-12 DIAGNOSIS — J33.8 POLYP OF NASAL SINUS: ICD-10-CM

## 2025-06-12 DIAGNOSIS — J32.4 CHRONIC PANSINUSITIS: Primary | ICD-10-CM

## 2025-06-12 DIAGNOSIS — J34.89 NASAL CRUSTING: ICD-10-CM

## 2025-06-12 PROCEDURE — 1160F RVW MEDS BY RX/DR IN RCRD: CPT | Performed by: OTOLARYNGOLOGY

## 2025-06-12 PROCEDURE — 1036F TOBACCO NON-USER: CPT | Performed by: OTOLARYNGOLOGY

## 2025-06-12 PROCEDURE — 99214 OFFICE O/P EST MOD 30 MIN: CPT | Performed by: OTOLARYNGOLOGY

## 2025-06-12 PROCEDURE — 31237 NSL/SINS NDSC SURG BX POLYPC: CPT | Performed by: OTOLARYNGOLOGY

## 2025-06-12 PROCEDURE — 3008F BODY MASS INDEX DOCD: CPT | Performed by: OTOLARYNGOLOGY

## 2025-06-12 PROCEDURE — 1159F MED LIST DOCD IN RCRD: CPT | Performed by: OTOLARYNGOLOGY

## 2025-06-23 ENCOUNTER — APPOINTMENT (OUTPATIENT)
Dept: PULMONOLOGY | Facility: HOSPITAL | Age: 75
End: 2025-06-23
Payer: MEDICARE

## 2025-07-02 ENCOUNTER — HOSPITAL ENCOUNTER (OUTPATIENT)
Dept: HOSPITAL 100 - MTLAB | Age: 75
Discharge: HOME | End: 2025-07-02
Payer: MEDICARE

## 2025-07-02 DIAGNOSIS — N40.0: ICD-10-CM

## 2025-07-02 DIAGNOSIS — I10: ICD-10-CM

## 2025-07-02 DIAGNOSIS — R73.03: Primary | ICD-10-CM

## 2025-07-02 LAB
CHOLEST SERPL-MCNC: 139 MG/DL (ref ?–200)
CHOLESTEROL:HDL RATIO SCREEN: 3.05
HBA1C MFR BLD: 6.2 % (ref ?–5.6)
HDLC SERPL-MCNC: 46 MG/DL
LOW DENSITY LIPOPROTEIN CALC.: 72 MG/DL
PSA,TOTAL- DIAGNOSTIC: 5.24 NG/ML (ref 0–4)
TRIGLYCERIDES: 106 MG/DL
VLDLC SERPL-MCNC: 21 MG/DL (ref 5–40)

## 2025-07-02 PROCEDURE — 84153 ASSAY OF PSA TOTAL: CPT

## 2025-07-02 PROCEDURE — 80061 LIPID PANEL: CPT

## 2025-07-02 PROCEDURE — 36415 COLL VENOUS BLD VENIPUNCTURE: CPT

## 2025-07-02 PROCEDURE — 83036 HEMOGLOBIN GLYCOSYLATED A1C: CPT

## 2025-07-07 ENCOUNTER — HOSPITAL ENCOUNTER (OUTPATIENT)
Dept: HOSPITAL 100 - CVS | Age: 75
Discharge: HOME | End: 2025-07-07
Payer: MEDICARE

## 2025-07-07 DIAGNOSIS — I73.9: Primary | ICD-10-CM

## 2025-07-07 PROCEDURE — 93922 UPR/L XTREMITY ART 2 LEVELS: CPT

## 2025-07-22 NOTE — PROGRESS NOTES
Sinus & Skull Base Surgery    HPI   Mark Meza is a 75 y.o. male h/o chronic pansinusitis with NP possible REAH, ITH, nasal drainage s/p bilateral ESS (total), bilateral ITR, removal of bilateral middle turbinates on 5/20/2025.  5/29/25 - Patient presents for his 1st post op visit. He denies acute concerns. He reports expected postoperative congestion and edema. The patient has been doing saline irrigations 4x daily, which have been productive of crusts and dried/old blood. His sense of taste and smell is improved.  He denies excessive bleeding, constant clear fluid from nose, severe headaches, double vision, or fever.  6/12/25 - Patient presents for his 2nd post op visit. He has a mild post-nasal drainage that makes him cough. He is irrigating 3x daily, twice with budesonide. Over the last few days, he has really started to notice an improvement in his sense of smell and taste.  He denies excessive bleeding, constant clear fluid from nose, severe headaches, double vision, or fever.  7/24/25 - Patient presents for a followup visit. He reports postnasal drainage that cleared initially after a large crust came out with an irrigation on 7/11/25. This has slowly returned since then. He denies any nasal obstruction, facial pressure. He can taste and smell. He continues to irrigate twice daily with budesonide. He sometimes irrigates in the middle of the day with saline.    Operative Report:  Date of Service: 5/20/2025  Location: [facility] [place] [facility]    Operation/Procedures:  1. Bilateral endoscopic total ethmoidectomies with sphenoidotomies with removal of tissue from sinuses  2. Bilateral endoscopic frontal sinusotomies with frontal sinus explorations  3. Bilateral endoscopic maxillary antrostomies with removal of tissue from sinuses  4. Bilateral inferior turbinate submucous resection  5. Image guidance navigation - extradural     Operative Findings:  1. Chronic inflammation through all sinuses, with nasal  sinus polyps and removal of middle turbinates bilaterally  2. Absorbable hemostatic material in the ethmoids  3. Sherwood Splint sutured to the septum bilaterally   4. Propel Contour stents placed in the frontal sinusotomies bilaterally    Assessment   Mark Meza is a 75 y.o. male h/o chronic pansinusitis with NP possible REAH, ITH, nasal drainage s/p bilateral ESS (total), bilateral ITR, removal of bilateral middle turbinates on 5/20/2025.  5/29/25 - 1st post op. Expected post op swelling today. Debridement performed as noted. Rx budesonide irrig BID. Continue saline irrig.  6/12/25 - 2nd post op. Expected edema but all sinuses patent today. Crusting continues to be debrided as expected. Continue saline and budesonide irrig BID. Rx Xhance.  7/24/25 - Overall well. Large crust came out 7/11/25. Still notices PND. Sinuses still healing along posterior max antrostomies and mild stenosis in frontal sinusotomies R more than L. Continue budesonide irrig BID and Xhance BID.    Plan   Nasal endoscopy. Findings: as noted.  Continue budesonide irrigations twice daily.  Continue Xhance, 1 puff in each nostril BID.  Reassurance provided to patient. The nose is healing.  Follow up with me in 6-8 weeks.    Braeden Mancuso MD [facility]  Division of Rhinology, Sinus, and Skull Base Surgery       Exam   General: This is a healthy appearing male who appears his stated age. The patient is alert and appropriately verbally conversant without hoarseness.  Face: The face was inspected and no cutaneous masses or lesions were visualized. There was no erythema or edema noted. Facial movement was symmetric without weakness. No skin lesions were detected.  Eyes: Extra-ocular muscle function was intact. No nystagmus was observed. Pupils were equal.    Nose: Examination of the nose revealed the nasal dorsum to be midline. Intranasal exam reveals the septum is healthy without perforation. The inferior turbinates were expectedly edematous.  Crusts and dried secretions noted on anterior rhinoscopy. See below procedure note as applicable for further exam.    Procedure Note:  Procedure: Nasal endoscopy with debridement - bilateral  Indication: Chronic rhinosinusitis, post operative from sinus surgery  Informed consent obtained: risks, benefits, alternatives, and expectations discussed with patient and the patient wishes to proceed.    Findings: After anesthesia and decongestion with topical lidocaine and Afrin spray, the nasal cavities were examined and debrided with a zero and/or 30-degree endoscope. Large crusts were taken down from the posterior maxillary antrostomy with a straight Blakesley forceps or ortiz. Crusts were debrided and removed from the middle meatus and ethmoid cavity on the right and left. Sphenoidotomies are clear. The maxillary and ethmoid sinuses are widely patent. The frontal recesses were clear. No pus or polyps were noted. Frontal sinusotomies are patent, with some mild to moderate stenosis of right more than left. There is no CSF leak. Things are healing well. The patient tolerated the procedure well and there were no complications.       Scribe and Provider Attestation  By signing my name below, Mark MCKEON Scribe, attest that this documentation has been prepared under the direction and in the presence of Braeden Mancuso MD. All medical record entries made by the scribe were at the direction of Braeden Mancuso MD or personally dictated by Braeden Mancuso MD. I, Braeden Mancuso MD, have reviewed the chart and agree that the record accurately reflects my personal performance of the history, physical exam, discussion and plan.

## 2025-07-22 NOTE — PATIENT INSTRUCTIONS
PATIENT INSTRUCTIONS ARE COMPLETE and READY TO PRINT    Continue saline and budesonide irrigations and Xhance as noted below:  Continue budesonide irrigations twice daily. After each irrigation, use 1 puff of Xhance in each nostril.  To prepare a budesonide irrigation: Always add 1 salt/baking soda packet to 8 ounces of distilled water, or use the saline recipe provided below. Shake to dissolve. After that, add 1 budesonide capsule to the saline solution. Use half of the mixture (4 ounces or 120 mL) in each nostril.  If you decide to microwave or otherwise heat up your nasal irrigation solution, be sure to only heat up the saline solution. Be sure to add the budesonide AFTER you have warmed the solution. Otherwise the heating process can deactivate the medicine. Follow all other instructions for the medicated saline irrigation. Heating up the irrigation solution is entirely optional if it improves your comfort.    Saline recipe:  Use distilled water. Prepare 8 ounces (or 240 mL) of distilled water with 1/2 teaspoon of baking soda and 1/2 teaspoon of table salt. Shake bottle to dissolve. Avoid using tap water. If you must use tap water, be sure to boil it, then let it cool down.    Xhance:  Please continue Xhance after each budesonide irrigation. Do 1 puff in each nostril twice a day.  To watch an Xhance demonstration video, please visit [email or web address]  Irrigate before using Xhance. Otherwise, you will wash Xhance out of your nose with the irrigation.    Follow up:  Please follow up with me in 6-8 weeks. If you have questions, feel free to contact my office at [phone] or send us a [employer] message.    Scribe and Provider Attestation  By signing my name below, I, Neha Iniguez, attest that this documentation has been prepared under the direction and in the presence of Braeden Mancuso MD. All medical record entries made by the scribe were at the direction of Braeden Mancuso MD or personally dictated by  Braeden Mancuso MD. I, Braeden Mancuso MD, have reviewed the chart and agree that the record accurately reflects my personal performance of the history, physical exam, discussion and plan.

## 2025-07-24 ENCOUNTER — APPOINTMENT (OUTPATIENT)
Dept: OTOLARYNGOLOGY | Facility: CLINIC | Age: 75
End: 2025-07-24
Payer: MEDICARE

## 2025-07-24 DIAGNOSIS — J32.4 CHRONIC PANSINUSITIS: Primary | ICD-10-CM

## 2025-07-24 DIAGNOSIS — J33.8 POLYP OF NASAL SINUS: ICD-10-CM

## 2025-07-24 PROCEDURE — 1159F MED LIST DOCD IN RCRD: CPT | Performed by: OTOLARYNGOLOGY

## 2025-07-24 PROCEDURE — 31231 NASAL ENDOSCOPY DX: CPT | Performed by: OTOLARYNGOLOGY

## 2025-07-24 PROCEDURE — 99213 OFFICE O/P EST LOW 20 MIN: CPT | Performed by: OTOLARYNGOLOGY

## 2025-07-24 PROCEDURE — 1160F RVW MEDS BY RX/DR IN RCRD: CPT | Performed by: OTOLARYNGOLOGY

## 2025-07-24 PROCEDURE — 1036F TOBACCO NON-USER: CPT | Performed by: OTOLARYNGOLOGY

## 2025-09-11 ENCOUNTER — APPOINTMENT (OUTPATIENT)
Dept: OTOLARYNGOLOGY | Facility: CLINIC | Age: 75
End: 2025-09-11
Payer: MEDICARE

## (undated) DEVICE — MANIFOLD, 4 PORT NEPTUNE STANDARD

## (undated) DEVICE — REST, HEAD, BAGEL, 9 IN

## (undated) DEVICE — TOWEL, SURGICAL, NEURO, O/R, 16 X 26, BLUE, STERILE

## (undated) DEVICE — COVER, CART, 45 X 27 X 48 IN, CLEAR

## (undated) DEVICE — DRAPE, INSTRUMENT, W/POUCH, STERI DRAPE, 7 X 11 IN, DISPOSABLE, STERILE

## (undated) DEVICE — DRESSING, HEMOPORE, 8CM

## (undated) DEVICE — TRACKER, STINGRAY, NON-INVASIVE, AXIEM STEALTH NAVIGATION

## (undated) DEVICE — Device

## (undated) DEVICE — BOWL, BASIN, 32 OZ, STERILE

## (undated) DEVICE — COVER, TABLE, 44 X 75 IN, DISPOSABLE, LF, STERILE

## (undated) DEVICE — BLADE, FUSION 4.3 X 13 ROTATABLE

## (undated) DEVICE — TUBE, SALEM SUMP, 16 FR X 48IN, ENFIT

## (undated) DEVICE — ELECTRODE, ELECTROSURGICAL, COAGULATOR, W/SUCTION, HANDSWITCHING, 8 FR, 6 IN

## (undated) DEVICE — KNIFE, ENT, MICROSURGICAL, INFERIOR TURBINATE, 2.9 MM

## (undated) DEVICE — DRAPE, FLUID WARMER